# Patient Record
Sex: FEMALE | Race: OTHER | HISPANIC OR LATINO | Employment: FULL TIME | URBAN - METROPOLITAN AREA
[De-identification: names, ages, dates, MRNs, and addresses within clinical notes are randomized per-mention and may not be internally consistent; named-entity substitution may affect disease eponyms.]

---

## 2018-08-16 ENCOUNTER — HOSPITAL ENCOUNTER (EMERGENCY)
Facility: HOSPITAL | Age: 23
Discharge: HOME | End: 2018-08-16
Attending: EMERGENCY MEDICINE
Payer: COMMERCIAL

## 2018-08-16 VITALS
WEIGHT: 112 LBS | TEMPERATURE: 98.5 F | DIASTOLIC BLOOD PRESSURE: 76 MMHG | SYSTOLIC BLOOD PRESSURE: 115 MMHG | OXYGEN SATURATION: 98 % | HEART RATE: 101 BPM | RESPIRATION RATE: 16 BRPM

## 2018-08-16 DIAGNOSIS — R35.0 URINARY FREQUENCY: ICD-10-CM

## 2018-08-16 DIAGNOSIS — J03.90 TONSILLITIS: Primary | ICD-10-CM

## 2018-08-16 LAB
BACTERIA UR QL AUTO: ABNORMAL /HPF
BILIRUB UR QL STRIP: NEGATIVE
CLARITY UR: CLEAR
COLOR UR: YELLOW
EXT PREG TEST URINE: NEGATIVE
GLUCOSE UR STRIP-MCNC: NEGATIVE MG/DL
HGB UR QL STRIP.AUTO: ABNORMAL
KETONES UR STRIP-MCNC: NEGATIVE MG/DL
LEUKOCYTE ESTERASE UR QL STRIP: NEGATIVE
NITRITE UR QL STRIP: NEGATIVE
NON-SQ EPI CELLS URNS QL MICRO: ABNORMAL /HPF
OTHER STN SPEC: ABNORMAL
PH UR STRIP.AUTO: 7 [PH] (ref 4.5–8)
PROT UR STRIP-MCNC: ABNORMAL MG/DL
RBC #/AREA URNS AUTO: ABNORMAL /HPF
SP GR UR STRIP.AUTO: 1.01 (ref 1–1.03)
UROBILINOGEN UR QL STRIP.AUTO: 1 E.U./DL
WBC #/AREA URNS AUTO: ABNORMAL /HPF

## 2018-08-16 PROCEDURE — 81001 URINALYSIS AUTO W/SCOPE: CPT

## 2018-08-16 PROCEDURE — 81025 URINE PREGNANCY TEST: CPT | Performed by: PHYSICIAN ASSISTANT

## 2018-08-16 PROCEDURE — 99283 EMERGENCY DEPT VISIT LOW MDM: CPT

## 2018-08-16 RX ORDER — BUPRENORPHINE AND NALOXONE 8; 2 MG/1; MG/1
2 FILM, SOLUBLE BUCCAL; SUBLINGUAL DAILY
COMMUNITY
Start: 2018-07-17

## 2018-08-16 RX ORDER — NORGESTIMATE AND ETHINYL ESTRADIOL 0.25-0.035
1 KIT ORAL DAILY
COMMUNITY
Start: 2018-06-14

## 2018-08-16 RX ORDER — DEXAMETHASONE 2 MG/1
10 TABLET ORAL ONCE
Qty: 5 TABLET | Refills: 0 | Status: SHIPPED | OUTPATIENT
Start: 2018-08-17 | End: 2018-08-17

## 2018-08-16 RX ORDER — CLINDAMYCIN HYDROCHLORIDE 150 MG/1
300 CAPSULE ORAL EVERY 8 HOURS SCHEDULED
Qty: 42 CAPSULE | Refills: 0 | Status: SHIPPED | OUTPATIENT
Start: 2018-08-16 | End: 2018-08-23

## 2018-08-16 RX ORDER — IBUPROFEN 600 MG/1
600 TABLET ORAL EVERY 8 HOURS PRN
Qty: 15 TABLET | Refills: 0 | Status: SHIPPED | OUTPATIENT
Start: 2018-08-16 | End: 2019-01-27

## 2018-08-17 NOTE — DISCHARGE INSTRUCTIONS
Tonsillitis, Ambulatory Care   GENERAL INFORMATION:   Tonsillitis  is an inflammation of the tonsils  Tonsils are 2 large lumps of tissue in the back of your throat  They help fight infection  Tonsillitis may be caused by a bacterial or a viral infection  Common symptoms include the following:   · Severe sore throat    · Red, swollen tonsils    · Painful swallowing    · Fever and chills    · Bad breath    · White spots on the tonsils  Seek immediate care  if you have trouble breathing because your tonsils are swollen  Treatment for tonsillitis  may include medicine to decrease throat pain  Antibiotic medicine may be given if your tonsillitis was caused by bacteria  You may also need surgery to remove your tonsils for chronic or recurrent tonsillitis  Prevent the spread of germs  by washing your hands often  Do not share food or drinks with anyone  Ask when you can return to work  Manage your symptoms:   · Drink plenty of liquids  to help prevent dehydration  Ask your healthcare provider how much you should drink  · Gargle with warm salt water  to help decrease throat pain  Mix 1 teaspoon of salt in 1 cup of warm water  Ask how often you should do this  Follow up with your healthcare provider as directed:  Write down your questions so you remember to ask them during your visits  CARE AGREEMENT:   You have the right to help plan your care  Learn about your health condition and how it may be treated  Discuss treatment options with your caregivers to decide what care you want to receive  You always have the right to refuse treatment  The above information is an  only  It is not intended as medical advice for individual conditions or treatments  Talk to your doctor, nurse or pharmacist before following any medical regimen to see if it is safe and effective for you    © 2014 [address] is for End User's use only and may not be sold, redistributed or otherwise used for commercial purposes  All illustrations and images included in CareNotes® are the copyrighted property of [employer]  or Benoit Pearson  Dysuria   WHAT YOU NEED TO KNOW:   Dysuria is difficulty urinating, or pain, burning, or discomfort with urination  Dysuria is usually a symptom of another problem  DISCHARGE INSTRUCTIONS:   Return to the emergency department if:   · You have severe back, side, or abdominal pain  · You have fever and shaking chills  · You vomit several times in a row  Contact your healthcare provider if:   · Your symptoms do not go away, even after treatment  · You have questions or concerns about your condition or care  Medicines:   · Medicines  may be given to help treat a bacterial infection or help decrease bladder spasms  · Take your medicine as directed  Contact your healthcare provider if you think your medicine is not helping or if you have side effects  Tell him of her if you are allergic to any medicine  Keep a list of the medicines, vitamins, and herbs you take  Include the amounts, and when and why you take them  Bring the list or the pill bottles to follow-up visits  Carry your medicine list with you in case of an emergency  Follow up with your healthcare provider as directed: Your healthcare provider may also refer you to a urologist or nephrologist to have additional testing  Write down your questions so you remember to ask them during your visits  Manage your dysuria:   · Drink more liquids  Liquids help flush out bacteria that may be causing an infection  Ask your healthcare provider how much liquid to drink each day and which liquids are best for you  · Take sitz baths as directed  Fill a bathtub with 4 to 6 inches of warm water  You may also use a sitz bath pan that fits over a toilet  Sit in the sitz bath for 20 minutes  Do this 2 to 3 times a day, or as directed  The warm water can help decrease pain and swelling     © 2017 [facility] [address] is for End User's use only and may not be sold, redistributed or otherwise used for commercial purposes  All illustrations and images included in CareNotes® are the copyrighted property of [employer]  or Benoit Pearson  The above information is an  only  It is not intended as medical advice for individual conditions or treatments  Talk to your doctor, nurse or pharmacist before following any medical regimen to see if it is safe and effective for you

## 2018-08-17 NOTE — ED PROVIDER NOTES
History  Chief Complaint   Patient presents with    Possible UTI     patient reports burning with urination and incresed frequency   Sore Throat     sore throat reported  History provided by:  Patient  Sore Throat   Location:  Generalized  Quality:  Sharp and sore  Severity:  Moderate  Onset quality:  Gradual  Duration:  2 days  Timing:  Constant  Progression:  Unchanged  Chronicity:  New  Relieved by:  None tried  Worsened by:  Swallowing and eating  Ineffective treatments:  None tried  Associated symptoms: adenopathy    Associated symptoms: no abdominal pain, no chest pain, no chills, no cough, no drooling, no ear discharge, no ear pain, no epistaxis, no eye discharge, no fever, no headaches, no neck stiffness, no night sweats, no plugged ear sensation, no postnasal drip, no rash, no rhinorrhea, no shortness of breath, no sinus congestion, no stridor, no trouble swallowing and no voice change    Risk factors: no exposure to strep, no exposure to mono, no sick contacts and no recent ENT procedure    Urinary Frequency   Location:  Bladder  Duration:  1 week  Timing:  Intermittent  Progression:  Waxing and waning  Chronicity:  New  Context:  Frequency, dysuria at times  no hematuria no flank pain no odor  no vaginal discharge or lesions  Associated symptoms: sore throat    Associated symptoms: no abdominal pain, no chest pain, no congestion, no cough, no diarrhea, no ear pain, no fever, no headaches, no rash, no rhinorrhea, no shortness of breath and no vomiting        Prior to Admission Medications   Prescriptions Last Dose Informant Patient Reported? Taking? buprenorphine-naloxone (SUBOXONE) 8-2 mg   Yes Yes   Sig: Place 2 Film under the tongue   norgestimate-ethinyl estradiol (SPRINTEC 28) 0 25-35 MG-MCG per tablet   Yes Yes   Sig: Take 1 tablet by mouth      Facility-Administered Medications: None       History reviewed  No pertinent past medical history  History reviewed   No pertinent surgical history  History reviewed  No pertinent family history  I have reviewed and agree with the history as documented  Social History   Substance Use Topics    Smoking status: Current Every Day Smoker    Smokeless tobacco: Never Used    Alcohol use No        Review of Systems   Constitutional: Negative for activity change, appetite change, chills, fever and night sweats  HENT: Positive for sore throat  Negative for congestion, drooling, ear discharge, ear pain, nosebleeds, postnasal drip, rhinorrhea, trouble swallowing and voice change  Eyes: Negative for discharge  Respiratory: Negative for cough, shortness of breath and stridor  Cardiovascular: Negative for chest pain and leg swelling  Gastrointestinal: Negative for abdominal pain, blood in stool, diarrhea and vomiting  Genitourinary: Positive for dysuria and frequency  Negative for decreased urine volume, difficulty urinating, vaginal bleeding, vaginal discharge and vaginal pain  Musculoskeletal: Negative for back pain, gait problem and neck stiffness  Skin: Negative for rash and wound  Neurological: Negative for dizziness and headaches  Hematological: Positive for adenopathy  Physical Exam  Physical Exam   Constitutional: She appears well-developed and well-nourished  No distress  HENT:   Head: Normocephalic and atraumatic  Head is without right periorbital erythema and without left periorbital erythema  Right Ear: Tympanic membrane and external ear normal    Left Ear: Tympanic membrane and external ear normal    Nose: Nose normal  No mucosal edema or rhinorrhea  Mouth/Throat: Uvula is midline and mucous membranes are normal  No oral lesions  No trismus in the jaw  No uvula swelling  Posterior oropharyngeal erythema present  No posterior oropharyngeal edema or tonsillar abscesses  Tonsils are 2+ on the right  Tonsils are 2+ on the left  Tonsillar exudate     Eyes: Conjunctivae are normal  Pupils are equal, round, and reactive to light  Right eye exhibits no discharge  Left eye exhibits no discharge  Neck: Normal range of motion  Neck supple  Cardiovascular: Normal rate, regular rhythm, normal heart sounds and intact distal pulses  No murmur heard  Pulmonary/Chest: Effort normal and breath sounds normal  No respiratory distress  She exhibits no tenderness  Musculoskeletal: She exhibits no edema  Lymphadenopathy:     She has cervical adenopathy  Neurological: She is alert  Skin: Skin is warm and dry  Capillary refill takes less than 2 seconds  No rash noted  She is not diaphoretic  Psychiatric: She has a normal mood and affect  Nursing note and vitals reviewed        Vital Signs  ED Triage Vitals [08/16/18 2314]   Temperature Pulse Respirations Blood Pressure SpO2   98 5 °F (36 9 °C) 101 16 115/76 98 %      Temp src Heart Rate Source Patient Position - Orthostatic VS BP Location FiO2 (%)   -- -- Sitting Right arm --      Pain Score       --           Vitals:    08/16/18 2314   BP: 115/76   Pulse: 101   Patient Position - Orthostatic VS: Sitting       Visual Acuity      ED Medications  Medications - No data to display    Diagnostic Studies  Results Reviewed     Procedure Component Value Units Date/Time    Urine Microscopic [[id number]]  (Abnormal) Collected:  08/16/18 2348    Lab Status:  Final result Specimen:  Urine from Urine, Other Updated:  08/16/18 2358     RBC, UA 1-2 (A) /hpf      WBC, UA 1-2 (A) /hpf      Epithelial Cells Occasional /hpf      Bacteria, UA Occasional /hpf      OTHER OBSERVATIONS Sperm Present    POCT pregnancy, urine [[id number]]  (Normal) Resulted:  08/16/18 2340    Lab Status:  Final result Updated:  08/16/18 2345     EXT PREG TEST UR (Ref: Negative) NEGATIVE    POCT urinalysis dipstick [[id number]]  (Abnormal) Resulted:  08/16/18 2340    Lab Status:  Final result Updated:  08/16/18 2345    ED Urine Macroscopic [[id number]]  (Abnormal) Collected:  08/16/18 2348    Lab Status:  Final result Specimen:  Urine Updated:  08/16/18 2341     Color, UA Yellow     Clarity, UA Clear     pH, UA 7 0     Leukocytes, UA Negative     Nitrite, UA Negative     Protein, UA Trace (A) mg/dl      Glucose, UA Negative mg/dl      Ketones, UA Negative mg/dl      Urobilinogen, UA 1 0 E U /dl      Bilirubin, UA Negative     Blood, UA Trace (A)     Specific [place], UA 1 015    Narrative:       CLINITEK RESULT                 No orders to display              Procedures  Procedures       Phone Contacts  ED Phone Contact    ED Course  ED Course as of Aug 17 0042   Thu Aug 16, 2018   2343 Color, UA: Yellow   2343 Leukocytes, UA: Negative   2343 Nitrite, UA: Negative   2343 Protein, UA: (!) Trace   2343 Blood, UA: (!) Trace   2348 EXT PREG TEST UR (Ref: Negative): NEGATIVE                               MDM  Number of Diagnoses or Management Options  Tonsillitis: new and does not require workup  Urinary frequency: new and does not require workup     Amount and/or Complexity of Data Reviewed  Clinical lab tests: reviewed and ordered    Risk of Complications, Morbidity, and/or Mortality  Presenting problems: low  Diagnostic procedures: minimal  Management options: minimal    Patient Progress  Patient progress: stable    CritCare Time    Disposition  Final diagnoses: Tonsillitis   Urinary frequency     Time reflects when diagnosis was documented in both MDM as applicable and the Disposition within this note     Time User Action Codes Description Comment    8/16/2018 11:48 PM Chitra Durán [J03 [id number]] Tonsillitis     8/16/2018 11:49 PM Chitra Durán [R35 0] Urinary frequency       ED Disposition     ED Disposition Condition Comment    Discharge  Amanda Mata discharge to home/self care      Condition at discharge: Good        Follow-up Information     Follow up With Specialties Details Why [address] Family Medicine Schedule an appointment as soon as possible for a visit To establish PCP, ER followup [address] [facility]  [phone]  [facility] Schedule an appointment as soon as possible for a visit To establish OBGYN [phone]          Discharge Medication List as of 8/16/2018 11:51 PM      START taking these medications    Details   clindamycin (CLEOCIN) 150 mg capsule Take 2 capsules (300 mg total) by mouth every 8 (eight) hours for 7 days, Starting Thu 8/16/2018, Until Thu 8/23/2018, Print      dexamethasone (DECADRON) 2 mg tablet Take 5 tablets (10 mg total) by mouth once for 1 dose, Starting Fri 8/17/2018, Print      ibuprofen (MOTRIN) 600 mg tablet Take 1 tablet (600 mg total) by mouth every 8 (eight) hours as needed for mild pain or moderate pain for up to 5 days, Starting Thu 8/16/2018, Until Tue 8/21/2018, Print         CONTINUE these medications which have NOT CHANGED    Details   buprenorphine-naloxone (SUBOXONE) 8-2 mg Place 2 Film under the tongue, Starting Tue 7/17/2018, Historical Med      norgestimate-ethinyl estradiol ([address] [address]) 0 25-35 MG-MCG per tablet Take 1 tablet by mouth, Starting Thu 6/14/2018, Historical Med           No discharge procedures on file      ED Provider  Electronically Signed by           Maryse Schuler PA-C  08/17/18 6299

## 2019-01-27 ENCOUNTER — HOSPITAL ENCOUNTER (EMERGENCY)
Facility: HOSPITAL | Age: 24
Discharge: HOME | End: 2019-01-27
Attending: EMERGENCY MEDICINE | Admitting: EMERGENCY MEDICINE
Payer: COMMERCIAL

## 2019-01-27 VITALS
SYSTOLIC BLOOD PRESSURE: 118 MMHG | DIASTOLIC BLOOD PRESSURE: 68 MMHG | OXYGEN SATURATION: 98 % | TEMPERATURE: 98 F | HEART RATE: 88 BPM | RESPIRATION RATE: 16 BRPM

## 2019-01-27 DIAGNOSIS — J06.9 VIRAL URI WITH COUGH: Primary | ICD-10-CM

## 2019-01-27 LAB — EXT PREG TEST URINE: NEGATIVE

## 2019-01-27 PROCEDURE — 81025 URINE PREGNANCY TEST: CPT | Performed by: EMERGENCY MEDICINE

## 2019-01-27 PROCEDURE — 99283 EMERGENCY DEPT VISIT LOW MDM: CPT

## 2019-01-27 RX ORDER — CETIRIZINE HYDROCHLORIDE 10 MG/1
10 TABLET ORAL DAILY
Qty: 30 TABLET | Refills: 0 | Status: SHIPPED | OUTPATIENT
Start: 2019-01-27 | End: 2019-03-15

## 2019-01-27 RX ORDER — FLUTICASONE PROPIONATE 50 MCG
1 SPRAY, SUSPENSION (ML) NASAL DAILY
Qty: 16 G | Refills: 0 | Status: SHIPPED | OUTPATIENT
Start: 2019-01-27 | End: 2019-03-15

## 2019-01-28 NOTE — ED PROVIDER NOTES
History  Chief Complaint   Patient presents with    Cold Like Symptoms     5 days of generalized weakness, headaches, and nausea  took excedrin at 1600 which relieved symptoms  Patient currently taking suboxone  History provided by:  Patient  URI   Presenting symptoms: congestion, cough, fever (102 yesterday) and sore throat    Severity:  Moderate  Onset quality:  Gradual  Duration:  5 days  Timing:  Sporadic  Progression:  Waxing and waning  Chronicity:  New  Relieved by:  OTC medications (HA relieved with tylenol)  Worsened by:  Nothing  Associated symptoms: no headaches, no neck pain and no wheezing    Risk factors: sick contacts (boyfriend had URI)    Risk factors: no diabetes mellitus and no recent travel        Prior to Admission Medications   Prescriptions Last Dose Informant Patient Reported? Taking? buprenorphine-naloxone (SUBOXONE) 8-2 mg   Yes Yes   Sig: Place 2 Film under the tongue   ibuprofen (MOTRIN) 600 mg tablet   No No   Sig: Take 1 tablet (600 mg total) by mouth every 8 (eight) hours as needed for mild pain or moderate pain for up to 5 days   norgestimate-ethinyl estradiol (SPRINTEC 28) 0 25-35 MG-MCG per tablet   Yes No   Sig: Take 1 tablet by mouth      Facility-Administered Medications: None       History reviewed  No pertinent past medical history  History reviewed  No pertinent surgical history  History reviewed  No pertinent family history  I have reviewed and agree with the history as documented  Social History   Substance Use Topics    Smoking status: Current Every Day Smoker     Packs/day: 1 00    Smokeless tobacco: Never Used    Alcohol use No        Review of Systems   Constitutional: Positive for fever (102 yesterday)  Negative for appetite change and chills  HENT: Positive for congestion, postnasal drip and sore throat  Respiratory: Positive for cough  Negative for shortness of breath and wheezing      Cardiovascular: Negative for chest pain and palpitations  Gastrointestinal: Positive for nausea  Negative for abdominal pain, diarrhea and vomiting  Genitourinary: Negative for dysuria and hematuria  Musculoskeletal: Negative for neck pain  Skin: Negative for rash  Neurological: Negative for dizziness, weakness and headaches  Psychiatric/Behavioral: Negative for suicidal ideas  All other systems reviewed and are negative  Physical Exam  Physical Exam   Constitutional: She is oriented to person, place, and time  Vital signs are normal  She appears well-developed and well-nourished  Non-toxic appearance  HENT:   Head: Normocephalic and atraumatic  Right Ear: Tympanic membrane and external ear normal    Left Ear: Tympanic membrane and external ear normal    Nose: Mucosal edema (congested) present  Mouth/Throat: Posterior oropharyngeal erythema (post nasal drip) present  Eyes: Pupils are equal, round, and reactive to light  Conjunctivae and EOM are normal    Neck: Normal range of motion and full passive range of motion without pain  Neck supple  No Brudzinski's sign and no Kernig's sign noted  Cardiovascular: Normal rate, regular rhythm, normal heart sounds, intact distal pulses and normal pulses  No murmur heard  Pulmonary/Chest: Effort normal and breath sounds normal  No tachypnea  No respiratory distress  She has no wheezes  Abdominal: Soft  Bowel sounds are normal  She exhibits no distension  There is no tenderness  There is no rigidity, no rebound and no guarding  Musculoskeletal: Normal range of motion  Right lower leg: She exhibits no swelling  Left lower leg: She exhibits no swelling  Lymphadenopathy:     She has no cervical adenopathy  Neurological: She is alert and oriented to person, place, and time  She has normal strength and normal reflexes  No cranial nerve deficit or sensory deficit  Coordination and gait normal  GCS eye subscore is 4  GCS verbal subscore is 5  GCS motor subscore is 6  Skin: Skin is warm and dry  No rash noted  She is not diaphoretic  No pallor  Psychiatric: She has a normal mood and affect  Her speech is normal and behavior is normal  Judgment and thought content normal  Cognition and memory are normal    Nursing note and vitals reviewed  Vital Signs  ED Triage Vitals [01/27/19 2112]   Temperature Pulse Respirations Blood Pressure SpO2   98 °F (36 7 °C) 88 16 118/68 98 %      Temp Source Heart Rate Source Patient Position - Orthostatic VS BP Location FiO2 (%)   Oral Monitor Sitting Right arm --      Pain Score       3           Vitals:    01/27/19 2112   BP: 118/68   Pulse: 88   Patient Position - Orthostatic VS: Sitting       Visual Acuity      ED Medications  Medications - No data to display    Diagnostic Studies  Results Reviewed     Procedure Component Value Units Date/Time    POCT pregnancy, urine [[id number]]     Lab Status:  No result                  No orders to display              Procedures  Procedures       Phone Contacts  ED Phone Contact    ED Course                               MDM  CritCare Time    Disposition  Final diagnoses:   Viral URI with cough     Time reflects when diagnosis was documented in both MDM as applicable and the Disposition within this note     Time User Action Codes Description Comment    1/27/2019  9:40 PM Shaan Durán [J06 9,  B97 89] Viral URI with cough       ED Disposition     ED Disposition Condition Comment    Discharge  Rodolfo Melo discharge to home/self care      Condition at discharge: Good        Follow-up Information     Follow up With Specialties Details Why Contact Info    [facility]  Call For followup with local primary care [phone]            Patient's Medications   Discharge Prescriptions    CETIRIZINE (ZYRTEC) 10 MG TABLET    Take 1 tablet (10 mg total) by mouth daily       Start Date: 1/27/2019 End Date: 1/27/2020       Order Dose: 10 mg       Quantity: 30 tablet    Refills: 0 DEXTROMETHORPHAN-GUAIFENESIN (MUCINEX DM)  MG PER 12 HR TABLET    Take 1 tablet by mouth every 12 (twelve) hours as needed for cough       Start Date: 1/27/2019 End Date: --       Order Dose: 1 tablet       Quantity: 10 tablet    Refills: 0    FLUTICASONE (FLONASE) 50 MCG/ACT NASAL SPRAY    1 spray into each nostril daily       Start Date: 1/27/2019 End Date: 1/27/2020       Order Dose: 1 spray       Quantity: 16 g    Refills: 0     No discharge procedures on file      ED Provider  Electronically Signed by           Stephanie Davis MD  01/27/19 1686

## 2019-03-15 ENCOUNTER — HOSPITAL ENCOUNTER (EMERGENCY)
Facility: HOSPITAL | Age: 24
Discharge: HOME | End: 2019-03-15
Attending: EMERGENCY MEDICINE
Payer: COMMERCIAL

## 2019-03-15 VITALS
WEIGHT: 111.99 LBS | RESPIRATION RATE: 16 BRPM | TEMPERATURE: 98.5 F | SYSTOLIC BLOOD PRESSURE: 109 MMHG | DIASTOLIC BLOOD PRESSURE: 67 MMHG | HEART RATE: 92 BPM | OXYGEN SATURATION: 100 %

## 2019-03-15 DIAGNOSIS — J02.9 PHARYNGITIS: Primary | ICD-10-CM

## 2019-03-15 DIAGNOSIS — N39.0 UTI (URINARY TRACT INFECTION): ICD-10-CM

## 2019-03-15 LAB
BACTERIA UR QL AUTO: ABNORMAL /HPF
BILIRUB UR QL STRIP: NEGATIVE
CLARITY UR: CLEAR
COLOR UR: ABNORMAL
EXT PREG TEST URINE: NEGATIVE
GLUCOSE UR STRIP-MCNC: ABNORMAL MG/DL
HGB UR QL STRIP.AUTO: NEGATIVE
KETONES UR STRIP-MCNC: ABNORMAL MG/DL
LEUKOCYTE ESTERASE UR QL STRIP: ABNORMAL
NITRITE UR QL STRIP: POSITIVE
NON-SQ EPI CELLS URNS QL MICRO: ABNORMAL /HPF
PH UR STRIP.AUTO: 5.5 [PH]
PROT UR STRIP-MCNC: ABNORMAL MG/DL
RBC #/AREA URNS AUTO: ABNORMAL /HPF
S PYO AG THROAT QL: NEGATIVE
SP GR UR STRIP.AUTO: 1.02 (ref 1–1.03)
UROBILINOGEN UR QL STRIP.AUTO: 4 E.U./DL
WBC #/AREA URNS AUTO: ABNORMAL /HPF

## 2019-03-15 PROCEDURE — 99283 EMERGENCY DEPT VISIT LOW MDM: CPT

## 2019-03-15 PROCEDURE — 81001 URINALYSIS AUTO W/SCOPE: CPT

## 2019-03-15 PROCEDURE — 87430 STREP A AG IA: CPT | Performed by: PHYSICIAN ASSISTANT

## 2019-03-15 PROCEDURE — 81025 URINE PREGNANCY TEST: CPT

## 2019-03-15 RX ORDER — PHENAZOPYRIDINE HYDROCHLORIDE 200 MG/1
200 TABLET, FILM COATED ORAL 3 TIMES DAILY
Qty: 6 TABLET | Refills: 0 | Status: SHIPPED | OUTPATIENT
Start: 2019-03-15

## 2019-03-15 RX ORDER — NITROFURANTOIN 25; 75 MG/1; MG/1
100 CAPSULE ORAL 2 TIMES DAILY
Qty: 10 CAPSULE | Refills: 0 | Status: SHIPPED | OUTPATIENT
Start: 2019-03-15 | End: 2019-03-20

## 2019-03-15 NOTE — ED PROVIDER NOTES
History  Chief Complaint   Patient presents with    Possible UTI     Patient reports she started experiencing urethra pain today  States "it feels like I'm being cut with a razor blade down there " Patient reports dysuria, denies abdominal pain  patient has hx of UTI   Sore Throat     X1 day, also reports nasal congestion, generalized body aches       23y  o female with no significant PMH presents to the ER for urethral pain since 11:00 today  Patient took Azo and Motrin for symptoms  She describes her pain as razor blades  She denies radiation of pain  Symptoms are constant  She is sexually active with one partner without protection  She denies history of kidney stone  Patient also complains of sore throat and myalgias for 2 days  She has been taking motrin for symptoms  She denies radiation of sore throat pain  Symptoms are constant  She denies sick contacts or recent travel  She denies fever, chills, rhinorrhea/congestion, chest pain, dyspnea, N/V/D, abdominal pain, weakness or paresthesias  History provided by:  Patient   used: No        Prior to Admission Medications   Prescriptions Last Dose Informant Patient Reported? Taking? buprenorphine-naloxone (SUBOXONE) 8-2 mg   Yes Yes   Sig: Place 2 Film under the tongue daily    norgestimate-ethinyl estradiol (SPRINTEC 28) 0 25-35 MG-MCG per tablet   Yes No   Sig: Take 1 tablet by mouth daily       Facility-Administered Medications: None       History reviewed  No pertinent past medical history  History reviewed  No pertinent surgical history  History reviewed  No pertinent family history  I have reviewed and agree with the history as documented  Social History     Tobacco Use    Smoking status: Current Every Day Smoker     Packs/day: 1 00    Smokeless tobacco: Never Used   Substance Use Topics    Alcohol use: No    Drug use: No        Review of Systems   Constitutional: Negative for chills and fever     HENT: Positive for sore throat  Negative for congestion, drooling, ear discharge, ear pain, facial swelling and rhinorrhea  Eyes: Negative for redness  Respiratory: Negative for shortness of breath  Cardiovascular: Negative for chest pain  Gastrointestinal: Negative for abdominal pain, diarrhea, nausea and vomiting  Genitourinary: Positive for dysuria  Negative for frequency, hematuria, urgency, vaginal bleeding and vaginal discharge  Musculoskeletal: Positive for back pain (entire back) and myalgias  Negative for neck stiffness  Skin: Negative for rash  Allergic/Immunologic: Negative for food allergies  Neurological: Negative for weakness and numbness  Physical Exam  Physical Exam   Constitutional:  Non-toxic appearance  No distress  HENT:   Head: Normocephalic and atraumatic  Right Ear: Tympanic membrane, external ear and ear canal normal  No drainage, swelling or tenderness  No foreign bodies  Tympanic membrane is not erythematous  No hemotympanum  Left Ear: Tympanic membrane, external ear and ear canal normal  No drainage, swelling or tenderness  No foreign bodies  Tympanic membrane is not erythematous  No hemotympanum  Nose: Nose normal    Mouth/Throat: Uvula is midline and mucous membranes are normal  No uvula swelling  Posterior oropharyngeal edema and posterior oropharyngeal erythema present  No tonsillar abscesses  No tonsillar exudate  Neck: Normal range of motion and phonation normal  Neck supple  No tracheal deviation present  Cardiovascular: Normal rate, regular rhythm, S1 normal, S2 normal and normal heart sounds  Exam reveals no gallop and no friction rub  No murmur heard  Pulmonary/Chest: Effort normal and breath sounds normal  No respiratory distress  She has no decreased breath sounds  She has no wheezes  She has no rhonchi  She has no rales  She exhibits no tenderness  Abdominal: Soft  Bowel sounds are normal  She exhibits no distension  There is no tenderness   There is no rebound, no guarding and no CVA tenderness  Neurological: She is alert  GCS eye subscore is 4  GCS verbal subscore is 5  GCS motor subscore is 6  Skin: Skin is warm and dry  No rash noted  Psychiatric: She has a normal mood and affect  Nursing note and vitals reviewed        Vital Signs  ED Triage Vitals [03/15/19 1331]   Temperature Pulse Respirations Blood Pressure SpO2   98 5 °F (36 9 °C) 92 16 109/67 100 %      Temp Source Heart Rate Source Patient Position - Orthostatic VS BP Location FiO2 (%)   Oral Monitor Sitting Right arm --      Pain Score       7           Vitals:    03/15/19 1331   BP: 109/67   Pulse: 92   Patient Position - Orthostatic VS: Sitting       qSOFA     Row Name 03/15/19 1331                Altered mental status GCS < 15  --        Respiratory Rate > / =29  0        Systolic BP < / =510  0        Q Sofa Score  0              Visual Acuity      ED Medications  Medications - No data to display    Diagnostic Studies  Results Reviewed     Procedure Component Value Units Date/Time    Urine Microscopic [[id number]]  (Abnormal) Collected:  03/15/19 1347    Lab Status:  Final result Specimen:  Urine, Clean Catch Updated:  03/15/19 1439     RBC, UA None Seen /hpf      WBC, UA 2-4 /hpf      Epithelial Cells Occasional /hpf      Bacteria, UA None Seen /hpf     UA w Reflex to Microscopic w Reflex to Culture [[id number]]  (Abnormal) Collected:  03/15/19 1347    Lab Status:  Final result Specimen:  Urine, Clean Catch Updated:  03/15/19 1439     Color, UA Orange     Clarity, UA Clear     Specific Gravity, UA 1 025     pH, UA 5 5     Leukocytes, UA Trace     Nitrite, UA Positive     Protein,  (2+) mg/dl      Glucose,  (1/10%) mg/dl      Ketones, UA Trace mg/dl      Urobilinogen, UA 4 0 E U /dl      Bilirubin, UA Negative     Blood, UA Negative    Narrative:       Results may be compromised due to color of urine    Rapid Strep A Screen Only, Adults [[id number]]  (Normal) Collected:  03/15/19 1347 Lab Status:  Final result Specimen:  Throat Updated:  03/15/19 1414     Rapid Strep A Screen Negative    POCT pregnancy, urine [[id number]]  (Normal) Resulted:  03/15/19 1347    Lab Status:  Final result Updated:  03/15/19 1347     EXT PREG TEST UR (Ref: Negative) negative                 No orders to display              Procedures  Procedures       Phone Contacts  ED Phone Contact    ED Course                               MDM  Number of Diagnoses or Management Options  Pharyngitis: new and requires workup  UTI (urinary tract infection): new and requires workup  Diagnosis management comments: DDX consists of but not limited to: UTI, kidney stone, strep, abscess, mono, viral syndrome, flu    Will check urine, pregnancy and strep  At discharge, I instructed the patient to:  -follow up with pcp  -take Tylenol or Motrin for pain  -take Macrobid as prescribed for UTI  -take Pyridium as prescribed for urinary symptoms  -rest and drink plenty of fluids  -return to the ER if symptoms worsened or new symptoms arose  Patient agreed to this plan and was stable at time of discharge  Amount and/or Complexity of Data Reviewed  Clinical lab tests: ordered and reviewed    Patient Progress  Patient progress: stable      Disposition  Final diagnoses:   Pharyngitis   UTI (urinary tract infection)     Time reflects when diagnosis was documented in both MDM as applicable and the Disposition within this note     Time User Action Codes Description Comment    3/15/2019  2:43 PM James CEDILLO Add [J02 9] Pharyngitis     3/15/2019  2:43 PM James CEDILLO Add [N39 0] UTI (urinary tract infection)       ED Disposition     ED Disposition Condition Date/Time Comment    Discharge Stable Fri Mar 15, 2019  2:43 PM Jenny Olivia discharge to home/self care              Follow-up Information     Follow up With Specialties Details Why Contact Info Additional [address] [facility] Schedule an appointment as soon as possible for a visit  As needed [address] [facility] [facility] [address] [facility] [address]  [address] [place] [address],[address], [address]          Patient's Medications   Discharge Prescriptions    NITROFURANTOIN (MACROBID) 100 MG CAPSULE    Take 1 capsule (100 mg total) by mouth 2 (two) times a day for 5 days       Start Date: 3/15/2019 End Date: 3/20/2019       Order Dose: 100 mg       Quantity: 10 capsule    Refills: 0    PHENAZOPYRIDINE (PYRIDIUM) 200 MG TABLET    Take 1 tablet (200 mg total) by mouth 3 (three) times a day       Start Date: 3/15/2019 End Date: --       Order Dose: 200 mg       Quantity: 6 tablet    Refills: 0     No discharge procedures on file      ED Provider  Electronically Signed by           Magdy Campbell PA-C  03/15/19 5877

## 2022-08-18 ENCOUNTER — HOSPITAL ENCOUNTER (EMERGENCY)
Facility: HOSPITAL | Age: 27
Discharge: HOME | End: 2022-08-18
Attending: EMERGENCY MEDICINE
Payer: COMMERCIAL

## 2022-08-18 ENCOUNTER — APPOINTMENT (EMERGENCY)
Dept: CT IMAGING | Facility: HOSPITAL | Age: 27
End: 2022-08-18
Payer: COMMERCIAL

## 2022-08-18 ENCOUNTER — APPOINTMENT (OUTPATIENT)
Dept: RADIOLOGY | Facility: HOSPITAL | Age: 27
End: 2022-08-18
Payer: COMMERCIAL

## 2022-08-18 VITALS
RESPIRATION RATE: 16 BRPM | TEMPERATURE: 97.7 F | HEART RATE: 102 BPM | SYSTOLIC BLOOD PRESSURE: 111 MMHG | OXYGEN SATURATION: 98 % | WEIGHT: 127.65 LBS | DIASTOLIC BLOOD PRESSURE: 67 MMHG

## 2022-08-18 DIAGNOSIS — E83.39 HYPOPHOSPHATEMIA: ICD-10-CM

## 2022-08-18 DIAGNOSIS — E87.6 HYPOKALEMIA: ICD-10-CM

## 2022-08-18 DIAGNOSIS — R07.9 CHEST PAIN, UNSPECIFIED TYPE: Primary | ICD-10-CM

## 2022-08-18 DIAGNOSIS — R00.2 PALPITATIONS: ICD-10-CM

## 2022-08-18 DIAGNOSIS — R00.0 TACHYCARDIA: ICD-10-CM

## 2022-08-18 DIAGNOSIS — N39.0 UTI (URINARY TRACT INFECTION): ICD-10-CM

## 2022-08-18 LAB
2HR DELTA HS TROPONIN: 2 NG/L
ALBUMIN SERPL BCP-MCNC: 4.2 G/DL (ref 3.5–5)
ALP SERPL-CCNC: 46 U/L (ref 46–116)
ALT SERPL W P-5'-P-CCNC: 21 U/L (ref 12–78)
AMPHETAMINES SERPL QL SCN: NEGATIVE
ANION GAP SERPL CALCULATED.3IONS-SCNC: 16 MMOL/L (ref 4–13)
AST SERPL W P-5'-P-CCNC: 37 U/L (ref 5–45)
ATRIAL RATE: 102 BPM
BACTERIA UR QL AUTO: ABNORMAL /HPF
BARBITURATES UR QL: NEGATIVE
BASE EX.OXY STD BLDV CALC-SCNC: 84.5 % (ref 60–80)
BASE EXCESS BLDV CALC-SCNC: -5.3 MMOL/L
BASOPHILS # BLD MANUAL: 0 THOUSAND/UL (ref 0–0.1)
BASOPHILS NFR MAR MANUAL: 0 % (ref 0–1)
BENZODIAZ UR QL: POSITIVE
BILIRUB SERPL-MCNC: 0.61 MG/DL (ref 0.2–1)
BILIRUB UR QL STRIP: NEGATIVE
BUN SERPL-MCNC: 6 MG/DL (ref 5–25)
CALCIUM SERPL-MCNC: 8.6 MG/DL (ref 8.3–10.1)
CARDIAC TROPONIN I PNL SERPL HS: 6 NG/L
CARDIAC TROPONIN I PNL SERPL HS: 8 NG/L
CHLORIDE SERPL-SCNC: 105 MMOL/L (ref 96–108)
CK MB SERPL-MCNC: 4.5 NG/ML (ref 0–5)
CK MB SERPL-MCNC: <1 % (ref 0–2.5)
CK SERPL-CCNC: 686 U/L (ref 26–192)
CLARITY UR: CLEAR
CO2 SERPL-SCNC: 20 MMOL/L (ref 21–32)
COCAINE UR QL: NEGATIVE
COLOR UR: YELLOW
CREAT SERPL-MCNC: 0.67 MG/DL (ref 0.6–1.3)
CRP SERPL QL: 3.2 MG/L
D DIMER PPP FEU-MCNC: 0.68 UG/ML FEU
EOSINOPHIL # BLD MANUAL: 0.08 THOUSAND/UL (ref 0–0.4)
EOSINOPHIL NFR BLD MANUAL: 1 % (ref 0–6)
ERYTHROCYTE [DISTWIDTH] IN BLOOD BY AUTOMATED COUNT: 12.2 % (ref 11.6–15.1)
ETHANOL SERPL-MCNC: 36 MG/DL (ref 0–3)
FLUAV RNA RESP QL NAA+PROBE: NEGATIVE
FLUBV RNA RESP QL NAA+PROBE: NEGATIVE
GFR SERPL CREATININE-BSD FRML MDRD: 122 ML/MIN/1.73SQ M
GLUCOSE SERPL-MCNC: 86 MG/DL (ref 65–140)
GLUCOSE SERPL-MCNC: 92 MG/DL (ref 65–140)
GLUCOSE UR STRIP-MCNC: NEGATIVE MG/DL
HCO3 BLDV-SCNC: 19 MMOL/L (ref 24–30)
HCT VFR BLD AUTO: 39.4 % (ref 34.8–46.1)
HGB BLD-MCNC: 13.2 G/DL (ref 11.5–15.4)
HGB UR QL STRIP.AUTO: NEGATIVE
KETONES UR STRIP-MCNC: ABNORMAL MG/DL
LACTATE SERPL-SCNC: 1.9 MMOL/L (ref 0.5–2)
LACTATE SERPL-SCNC: 2.9 MMOL/L (ref 0.5–2)
LEUKOCYTE ESTERASE UR QL STRIP: ABNORMAL
LIPASE SERPL-CCNC: 175 U/L (ref 73–393)
LYMPHOCYTES # BLD AUTO: 1.02 THOUSAND/UL (ref 0.6–4.47)
LYMPHOCYTES # BLD AUTO: 13 % (ref 14–44)
MAGNESIUM SERPL-MCNC: 1.8 MG/DL (ref 1.6–2.6)
MCH RBC QN AUTO: 29.8 PG (ref 26.8–34.3)
MCHC RBC AUTO-ENTMCNC: 33.5 G/DL (ref 31.4–37.4)
MCV RBC AUTO: 89 FL (ref 82–98)
METHADONE UR QL: NEGATIVE
METHGB MFR BLDCO: 0.2 % (ref 0.1–1.5)
MONOCYTES # BLD AUTO: 0.47 THOUSAND/UL (ref 0–1.22)
MONOCYTES NFR BLD: 6 % (ref 4–12)
NEUTROPHILS # BLD MANUAL: 5.48 THOUSAND/UL (ref 1.85–7.62)
NEUTS SEG NFR BLD AUTO: 70 % (ref 43–75)
NITRITE UR QL STRIP: NEGATIVE
NON-SQ EPI CELLS URNS QL MICRO: ABNORMAL /HPF
O2 CT BLDV-SCNC: 16.8 ML/DL
OPIATES UR QL SCN: NEGATIVE
OXYCODONE+OXYMORPHONE UR QL SCN: NEGATIVE
P AXIS: 64 DEGREES
PCO2 BLDV: 33.7 MM HG (ref 42–50)
PCP UR QL: NEGATIVE
PH BLDV: 7.37 [PH] (ref 7.3–7.4)
PH UR STRIP.AUTO: 6 [PH]
PHOSPHATE SERPL-MCNC: 1.9 MG/DL (ref 2.7–4.5)
PLATELET # BLD AUTO: 196 THOUSANDS/UL (ref 149–390)
PLATELET BLD QL SMEAR: ADEQUATE
PMV BLD AUTO: 10.9 FL (ref 8.9–12.7)
PO2 BLDV: 52.1 MM HG (ref 35–45)
POTASSIUM SERPL-SCNC: 3.3 MMOL/L (ref 3.5–5.3)
PR INTERVAL: 164 MS
PROT SERPL-MCNC: 7.5 G/DL (ref 6.4–8.4)
PROT UR STRIP-MCNC: NEGATIVE MG/DL
QRS AXIS: 97 DEGREES
QRSD INTERVAL: 86 MS
QT INTERVAL: 356 MS
QTC INTERVAL: 463 MS
RBC # BLD AUTO: 4.43 MILLION/UL (ref 3.81–5.12)
RBC #/AREA URNS AUTO: ABNORMAL /HPF
RBC MORPH BLD: NORMAL
RSV RNA RESP QL NAA+PROBE: NEGATIVE
SARS-COV-2 RNA RESP QL NAA+PROBE: NEGATIVE
SODIUM SERPL-SCNC: 141 MMOL/L (ref 135–147)
SP GR UR STRIP.AUTO: 1.01 (ref 1–1.03)
T WAVE AXIS: 58 DEGREES
THC UR QL: POSITIVE
TSH SERPL DL<=0.05 MIU/L-ACNC: 0.73 UIU/ML (ref 0.45–4.5)
UROBILINOGEN UR QL STRIP.AUTO: 0.2 E.U./DL
VARIANT LYMPHS # BLD AUTO: 10 %
VENTRICULAR RATE: 102 BPM
WBC # BLD AUTO: 7.83 THOUSAND/UL (ref 4.31–10.16)
WBC #/AREA URNS AUTO: ABNORMAL /HPF

## 2022-08-18 PROCEDURE — 99285 EMERGENCY DEPT VISIT HI MDM: CPT

## 2022-08-18 PROCEDURE — 85025 COMPLETE CBC W/AUTO DIFF WBC: CPT | Performed by: EMERGENCY MEDICINE

## 2022-08-18 PROCEDURE — 82077 ASSAY SPEC XCP UR&BREATH IA: CPT | Performed by: EMERGENCY MEDICINE

## 2022-08-18 PROCEDURE — 84443 ASSAY THYROID STIM HORMONE: CPT | Performed by: EMERGENCY MEDICINE

## 2022-08-18 PROCEDURE — 82553 CREATINE MB FRACTION: CPT | Performed by: EMERGENCY MEDICINE

## 2022-08-18 PROCEDURE — 83605 ASSAY OF LACTIC ACID: CPT | Performed by: EMERGENCY MEDICINE

## 2022-08-18 PROCEDURE — 83050 HGB METHEMOGLOBIN QUAN: CPT | Performed by: EMERGENCY MEDICINE

## 2022-08-18 PROCEDURE — 84100 ASSAY OF PHOSPHORUS: CPT | Performed by: EMERGENCY MEDICINE

## 2022-08-18 PROCEDURE — 96360 HYDRATION IV INFUSION INIT: CPT

## 2022-08-18 PROCEDURE — 80053 COMPREHEN METABOLIC PANEL: CPT | Performed by: EMERGENCY MEDICINE

## 2022-08-18 PROCEDURE — 36415 COLL VENOUS BLD VENIPUNCTURE: CPT | Performed by: EMERGENCY MEDICINE

## 2022-08-18 PROCEDURE — G1004 CDSM NDSC: HCPCS

## 2022-08-18 PROCEDURE — 99285 EMERGENCY DEPT VISIT HI MDM: CPT | Performed by: EMERGENCY MEDICINE

## 2022-08-18 PROCEDURE — 82805 BLOOD GASES W/O2 SATURATION: CPT | Performed by: EMERGENCY MEDICINE

## 2022-08-18 PROCEDURE — 84484 ASSAY OF TROPONIN QUANT: CPT | Performed by: EMERGENCY MEDICINE

## 2022-08-18 PROCEDURE — 82550 ASSAY OF CK (CPK): CPT | Performed by: EMERGENCY MEDICINE

## 2022-08-18 PROCEDURE — 82948 REAGENT STRIP/BLOOD GLUCOSE: CPT

## 2022-08-18 PROCEDURE — 85007 BL SMEAR W/DIFF WBC COUNT: CPT | Performed by: EMERGENCY MEDICINE

## 2022-08-18 PROCEDURE — 86140 C-REACTIVE PROTEIN: CPT | Performed by: EMERGENCY MEDICINE

## 2022-08-18 PROCEDURE — 71045 X-RAY EXAM CHEST 1 VIEW: CPT

## 2022-08-18 PROCEDURE — 81001 URINALYSIS AUTO W/SCOPE: CPT | Performed by: EMERGENCY MEDICINE

## 2022-08-18 PROCEDURE — 93010 ELECTROCARDIOGRAM REPORT: CPT | Performed by: INTERNAL MEDICINE

## 2022-08-18 PROCEDURE — 96361 HYDRATE IV INFUSION ADD-ON: CPT

## 2022-08-18 PROCEDURE — 85379 FIBRIN DEGRADATION QUANT: CPT | Performed by: EMERGENCY MEDICINE

## 2022-08-18 PROCEDURE — 0241U HB NFCT DS VIR RESP RNA 4 TRGT: CPT | Performed by: EMERGENCY MEDICINE

## 2022-08-18 PROCEDURE — 83690 ASSAY OF LIPASE: CPT | Performed by: EMERGENCY MEDICINE

## 2022-08-18 PROCEDURE — 80307 DRUG TEST PRSMV CHEM ANLYZR: CPT | Performed by: EMERGENCY MEDICINE

## 2022-08-18 PROCEDURE — 93005 ELECTROCARDIOGRAM TRACING: CPT

## 2022-08-18 PROCEDURE — 87086 URINE CULTURE/COLONY COUNT: CPT | Performed by: EMERGENCY MEDICINE

## 2022-08-18 PROCEDURE — 71275 CT ANGIOGRAPHY CHEST: CPT

## 2022-08-18 PROCEDURE — 83735 ASSAY OF MAGNESIUM: CPT | Performed by: EMERGENCY MEDICINE

## 2022-08-18 PROCEDURE — 85027 COMPLETE CBC AUTOMATED: CPT | Performed by: EMERGENCY MEDICINE

## 2022-08-18 RX ORDER — CIPROFLOXACIN 500 MG/1
500 TABLET, FILM COATED ORAL 2 TIMES DAILY
Qty: 14 TABLET | Refills: 0 | Status: SHIPPED | OUTPATIENT
Start: 2022-08-18 | End: 2022-08-23

## 2022-08-18 RX ORDER — LIDOCAINE HYDROCHLORIDE 20 MG/ML
15 SOLUTION OROPHARYNGEAL
COMMUNITY
Start: 2022-04-04

## 2022-08-18 RX ORDER — CIPROFLOXACIN 500 MG/1
500 TABLET, FILM COATED ORAL ONCE
Status: COMPLETED | OUTPATIENT
Start: 2022-08-18 | End: 2022-08-18

## 2022-08-18 RX ADMIN — SODIUM CHLORIDE 1000 ML: 0.9 INJECTION, SOLUTION INTRAVENOUS at 07:21

## 2022-08-18 RX ADMIN — SODIUM CHLORIDE 1000 ML: 0.9 INJECTION, SOLUTION INTRAVENOUS at 06:25

## 2022-08-18 RX ADMIN — POTASSIUM & SODIUM PHOSPHATES POWDER PACK 280-160-250 MG 2 PACKET: 280-160-250 PACK at 08:25

## 2022-08-18 RX ADMIN — IOHEXOL 65 ML: 350 INJECTION, SOLUTION INTRAVENOUS at 07:14

## 2022-08-18 RX ADMIN — CIPROFLOXACIN HYDROCHLORIDE 500 MG: 500 TABLET, FILM COATED ORAL at 09:51

## 2022-08-18 NOTE — ED PROVIDER NOTES
History  Chief Complaint   Patient presents with    Rapid Heart Rate     Patient complaining of fast heart rate, feels like her arms are on fire and feels restless  Patient was seen at [facility] yesterday  Patient recently had tooth extracted  23-year-old female with past medical history pertinent for Suboxone use, current Bactrim use for UTI, previous reported "blood clot in the chest" (though denies "PE") after she had her baby who presents to the emergency department with  for evaluation of palpitations, continued chest pain, arm pain and lower leg pain along with restlessness and feeling like her skin is on fire after being seen at the hospital yesterday after having similar symptoms after patient had her tooth extracted from a dentist today before  Per chart review, patient had a full workup including troponins which were noted to trend upward initially and then back down  Patient had troponin of 17, 43 and then back to 16  Patient was given benzodiazepines yesterday and reports that she went home after she was discharged and slept for almost 8 hours  States that she began having symptoms of shin she woke up  States that she has been using Suboxone for the last 5 years states that she used to take 2 8 mg film daily up until about 6 months ago when she switched to 1 film daily  States that she switch from film to tablets about 5 days ago  States the mg still the same at 8 mg daily  States she normally takes her Suboxone at 8:00 a m  daily but did not take it yesterday until around noon because that was when she was discharged  States that she feels like she is in withdrawal, sweating and diaphoretic with chest pain and symptoms of palpitations  Patient reports that she also is being treated for possible UTI with Bactrim which she has been taking for the last 4 days  Patient reports still having some burning on urination     here was concerned that she may have a reaction to the dental numbing medication that was used to extract the tooth as she and he reports that they had use 4 shots to numb her tooth  States they were worried that patient may have methemoglobinemia  Patient reports that she was short of breath last night  Also reports still having chest pain  No other concerns at this time  Prior to Admission Medications   Prescriptions Last Dose Informant Patient Reported? Taking? Lidocaine Viscous HCl (XYLOCAINE) 2 % mucosal solution Unknown at Unknown time  Yes No   Sig: 15 mL by Transmucosal route   buprenorphine-naloxone (Suboxone) 8-2 mg Unknown at Unknown time  Yes No   Sig: Place 2 Film under the tongue daily    norgestimate-ethinyl estradiol (ORTHO-CYCLEN) 0 25-35 MG-MCG per tablet Not Taking at Unknown time  Yes No   Sig: Take 1 tablet by mouth daily    Patient not taking: Reported on 8/18/2022   phenazopyridine (PYRIDIUM) 200 mg tablet Not Taking at Unknown time  No No   Sig: Take 1 tablet (200 mg total) by mouth 3 (three) times a day   Patient not taking: Reported on 8/18/2022      Facility-Administered Medications: None       History reviewed  No pertinent past medical history  History reviewed  No pertinent surgical history  History reviewed  No pertinent family history  I have reviewed and agree with the history as documented  E-Cigarette/Vaping    E-Cigarette Use Never User      E-Cigarette/Vaping Substances     Social History     Tobacco Use    Smoking status: Current Every Day Smoker     Packs/day: 1 00    Smokeless tobacco: Never Used   Vaping Use    Vaping Use: Never used   Substance Use Topics    Alcohol use: No    Drug use: No       Review of Systems   Constitutional: Positive for diaphoresis  Negative for activity change, appetite change, chills and fever  HENT: Negative for congestion, rhinorrhea and sore throat  Eyes: Negative for visual disturbance  Respiratory: Positive for shortness of breath   Negative for chest tightness and wheezing  Cardiovascular: Positive for chest pain and palpitations  Negative for leg swelling  Gastrointestinal: Negative for abdominal pain, constipation, diarrhea, nausea and vomiting  Genitourinary: Positive for dysuria  Negative for difficulty urinating, flank pain, hematuria and pelvic pain  Musculoskeletal: Positive for myalgias  Negative for back pain and neck pain  Skin: Negative for rash  Neurological: Negative for weakness, numbness and headaches  Psychiatric/Behavioral: Negative for behavioral problems  Physical Exam  Physical Exam  Vitals and nursing note reviewed  Constitutional:       General: She is not in acute distress  Appearance: She is well-developed  She is diaphoretic  HENT:      Head: Normocephalic and atraumatic  Right Ear: External ear normal       Left Ear: External ear normal       Nose: Nose normal       Mouth/Throat:      Mouth: Mucous membranes are moist    Eyes:      Pupils: Pupils are equal, round, and reactive to light  Cardiovascular:      Rate and Rhythm: Regular rhythm  Tachycardia present  Pulses: Normal pulses  Heart sounds: Normal heart sounds  Pulmonary:      Effort: Pulmonary effort is normal  No respiratory distress  Breath sounds: Normal breath sounds  No wheezing or rales  Abdominal:      General: Bowel sounds are normal       Palpations: Abdomen is soft  Tenderness: There is no abdominal tenderness  Musculoskeletal:         General: No tenderness or deformity  Normal range of motion  Cervical back: Normal range of motion and neck supple  Skin:     General: Skin is warm  Capillary Refill: Capillary refill takes less than 2 seconds  Neurological:      General: No focal deficit present  Mental Status: She is alert and oriented to person, place, and time  Motor: No abnormal muscle tone  Comments: Patient is awake and alert and oriented x 3     No apparent deficits of memory or concentration  Speech is fluent  Fund of knowledge is appropriate  CN II - No field cuts by confrontation  CN III, IV, VI - pupils are 3 mm and briskly reactive  EOMs are full with no nystagmus  CN V - facial sensation is intact  CN VII - no facial asymmetry  CN VIII - auditory acuity is grossly intact to finger rub bilaterally  CN IX - palate rises symmetrically  CN XI - SCM and trapezius muscles are intact  CN XII - tongue protrudes midline  Sensory exam in intact to light touch, pinprick in all dermatomes tested  Coordination is grossly intact for finger-to-nose  5/5 strength in all extremities  Vital Signs  ED Triage Vitals [08/18/22 0525]   Temperature Pulse Respirations Blood Pressure SpO2   97 7 °F (36 5 °C) (!) 116 18 123/81 98 %      Temp Source Heart Rate Source Patient Position - Orthostatic VS BP Location FiO2 (%)   Temporal Monitor Lying Right arm --      Pain Score       --           Vitals:    08/18/22 0525 08/18/22 0530   BP: 123/81 123/81   Pulse: (!) 116 93   Patient Position - Orthostatic VS: Lying          Visual Acuity      ED Medications  Medications   sodium chloride 0 9 % bolus 1,000 mL (1,000 mL Intravenous New Bag 8/18/22 0625)   potassium-sodium phosphates (PHOS-NAK) packet 2 packet (has no administration in time range)   sodium chloride 0 9 % bolus 1,000 mL (has no administration in time range)       Diagnostic Studies  Results Reviewed     Procedure Component Value Units Date/Time    Urine Microscopic [[id number]]  (Abnormal) Collected: 08/18/22 0625    Lab Status: Final result Specimen: Urine, Clean Catch Updated: 08/18/22 0655     RBC, UA 2-4 /hpf      WBC, UA 10-20 /hpf      Epithelial Cells Moderate /hpf      Bacteria, UA Moderate /hpf     Urine culture [[id number]] Collected: 08/18/22 0625    Lab Status:  In process Specimen: Urine, Clean Catch Updated: 08/18/22 0655    Comprehensive metabolic panel [[id number]]  (Abnormal) Collected: 08/18/22 0618    Lab Status: Final result Specimen: Blood from Arm, Left Updated: 08/18/22 6146     Sodium 141 mmol/L      Potassium 3 3 mmol/L      Chloride 105 mmol/L      CO2 20 mmol/L      ANION GAP 16 mmol/L      BUN 6 mg/dL      Creatinine 0 67 mg/dL      Glucose 86 mg/dL      Calcium 8 6 mg/dL      AST 37 U/L      ALT 21 U/L      Alkaline Phosphatase 46 U/L      Total Protein 7 5 g/dL      Albumin 4 2 g/dL      Total Bilirubin 0 61 mg/dL      eGFR 122 ml/min/1 73sq m     Narrative:      [place] guidelines for Chronic Kidney Disease (CKD):     Stage 1 with normal or high GFR (GFR > 90 mL/min/1 73 square meters)    Stage 2 Mild CKD (GFR = 60-89 mL/min/1 73 square meters)    Stage 3A Moderate CKD (GFR = 45-59 mL/min/1 73 square meters)    Stage 3B Moderate CKD (GFR = 30-44 mL/min/1 73 square meters)    Stage 4 Severe CKD (GFR = 15-29 mL/min/1 73 square meters)    Stage 5 End Stage CKD (GFR <15 mL/min/1 73 square meters)  Note: GFR calculation is accurate only with a steady state creatinine    Magnesium [[id number]]  (Normal) Collected: 08/18/22 0618    Lab Status: Final result Specimen: Blood from Arm, Left Updated: 08/18/22 0652     Magnesium 1 8 mg/dL     Phosphorus [[id number]]  (Abnormal) Collected: 08/18/22 0618    Lab Status: Final result Specimen: Blood from Arm, Left Updated: 08/18/22 0652     Phosphorus 1 9 mg/dL     Lipase [[id number]]  (Normal) Collected: 08/18/22 0618    Lab Status: Final result Specimen: Blood from Arm, Left Updated: 08/18/22 0652     Lipase 175 u/L     C-reactive protein [[id number]]  (Abnormal) Collected: 08/18/22 0618    Lab Status: Final result Specimen: Blood from Arm, Left Updated: 08/18/22 0652     CRP 3 2 mg/L     D-dimer, quantitative [[id number]]  (Abnormal) Collected: 08/18/22 0618    Lab Status: Final result Specimen: Blood from Arm, Left Updated: 08/18/22 0650     D-Dimer, Quant 0 68 ug/ml FEU     CK (with reflex to MB) [[id number]]  (Abnormal) Collected: 08/18/22 1242 Lab Status: Final result Specimen: Blood from Arm, Left Updated: 08/18/22 0649     Total  U/L     CKMB [[id number]] Collected: 08/18/22 0618    Lab Status: In process Specimen: Blood from Arm, Left Updated: 08/18/22 0649    CBC and differential [[id number]]  (Normal) Collected: 08/18/22 0618    Lab Status: Final result Specimen: Blood from Arm, Left Updated: 08/18/22 0645     WBC 7 83 Thousand/uL      RBC 4 43 Million/uL      Hemoglobin 13 2 g/dL      Hematocrit 39 4 %      MCV 89 fL      MCH 29 8 pg      MCHC 33 5 g/dL      RDW 12 2 %      MPV 10 9 fL      Platelets 460 Thousands/uL     Narrative: This is an appended report  These results have been appended to a previously verified report  Manual Differential(PHLEBS Do Not Order) [[id number]] Collected: 08/18/22 0618    Lab Status: In process Specimen: Blood from Arm, Left Updated: 08/18/22 0645    UA w Reflex to Microscopic w Reflex to Culture [[id number]]  (Abnormal) Collected: 08/18/22 0625    Lab Status: Final result Specimen: Urine, Clean Catch Updated: 08/18/22 0635     Color, UA Yellow     Clarity, UA Clear     Specific Gravity, UA 1 010     pH, UA 6 0     Leukocytes, UA Small     Nitrite, UA Negative     Protein, UA Negative mg/dl      Glucose, UA Negative mg/dl      Ketones, UA 15 (1+) mg/dl      Urobilinogen, UA 0 2 E U /dl      Bilirubin, UA Negative     Occult Blood, UA Negative    TSH [[id number]] Collected: 08/18/22 0618    Lab Status: In process Specimen: Blood from Arm, Left Updated: 08/18/22 0634    Methemoglobin [[id number]]  (Normal) Collected: 08/18/22 0618    Lab Status: Final result Specimen: Blood from Arm, Left Updated: 08/18/22 0634     Methemoglobin 0 2 %     Narrative:       Therapeutic levels (1 mg/mL and 2 mg/mL) of hydroxocobalamin may interfere with the fCOHb and fMetHb where it may cause lower than expected values    Blood gas, venous [[id number]]  (Abnormal) Collected: 08/18/22 0618    Lab Status: Final result Specimen: Blood from Arm, Left Updated: 08/18/22 0633     pH, Cipriano 7 369     pCO2, Cipriano 33 7 mm Hg      pO2, Cipriano 52 1 mm Hg      HCO3, Cipriano 19 0 mmol/L      Base Excess, Cipriano -5 3 mmol/L      O2 Content, Cipriano 16 8 ml/dL      O2 HGB, VENOUS 84 5 %     HS Troponin 0hr (reflex protocol) [[id number]] Collected: 08/18/22 0618    Lab Status: In process Specimen: Blood from Arm, Left Updated: 08/18/22 0626    Ethanol [[id number]] Collected: 08/18/22 0618    Lab Status: In process Specimen: Blood from Arm, Left Updated: 08/18/22 0626    Rapid drug screen, urine [[id number]] Collected: 08/18/22 0618    Lab Status: In process Specimen: Urine, Clean Catch Updated: 08/18/22 0626    Lactic acid, plasma [[id number]] Collected: 08/18/22 0618    Lab Status: In process Specimen: Blood from Arm, Left Updated: 08/18/22 0626    FLU/RSV/COVID - if FLU/RSV clinically relevant [[id number]] Collected: 08/18/22 0624    Lab Status:  In process Specimen: Nares from Nose Updated: 08/18/22 0625    Fingerstick Glucose (POCT) [[id number]]  (Normal) Collected: 08/18/22 0526    Lab Status: Final result Updated: 08/18/22 0534     POC Glucose 92 mg/dl                  XR chest 1 view portable    (Results Pending)   CTA ED chest PE Study    (Results Pending)              Procedures  ECG 12 Lead Documentation Only    Date/Time: 8/18/2022 5:33 AM  Performed by: Angel Johnson MD  Authorized by: Angel Johnson MD     ECG reviewed by me, the ED Provider: yes    Patient location:  ED  Previous ECG:     Previous ECG:  Unavailable  Interpretation:     Interpretation: abnormal    Rate:     ECG rate:  102    ECG rate assessment: tachycardic    Rhythm:     Rhythm: sinus tachycardia    Ectopy:     Ectopy: none    QRS:     QRS axis:  Right  Conduction:     Conduction: normal    ST segments:     ST segments:  Normal  T waves:     T waves: normal               ED Course          RESULTS:  Results Reviewed     Procedure Component Value Units Date/Time    Urine Microscopic [[id number]] (Abnormal) Collected: 08/18/22 0625    Lab Status: Final result Specimen: Urine, Clean Catch Updated: 08/18/22 0655     RBC, UA 2-4 /hpf      WBC, UA 10-20 /hpf      Epithelial Cells Moderate /hpf      Bacteria, UA Moderate /hpf     Urine culture [[id number]] Collected: 08/18/22 8175    Lab Status:  In process Specimen: Urine, Clean Catch Updated: 08/18/22 0655    Comprehensive metabolic panel [[id number]]  (Abnormal) Collected: 08/18/22 0618    Lab Status: Final result Specimen: Blood from Arm, Left Updated: 08/18/22 6427     Sodium 141 mmol/L      Potassium 3 3 mmol/L      Chloride 105 mmol/L      CO2 20 mmol/L      ANION GAP 16 mmol/L      BUN 6 mg/dL      Creatinine 0 67 mg/dL      Glucose 86 mg/dL      Calcium 8 6 mg/dL      AST 37 U/L      ALT 21 U/L      Alkaline Phosphatase 46 U/L      Total Protein 7 5 g/dL      Albumin 4 2 g/dL      Total Bilirubin 0 61 mg/dL      eGFR 122 ml/min/1 73sq m     Narrative:      [place] guidelines for Chronic Kidney Disease (CKD):     Stage 1 with normal or high GFR (GFR > 90 mL/min/1 73 square meters)    Stage 2 Mild CKD (GFR = 60-89 mL/min/1 73 square meters)    Stage 3A Moderate CKD (GFR = 45-59 mL/min/1 73 square meters)    Stage 3B Moderate CKD (GFR = 30-44 mL/min/1 73 square meters)    Stage 4 Severe CKD (GFR = 15-29 mL/min/1 73 square meters)    Stage 5 End Stage CKD (GFR <15 mL/min/1 73 square meters)  Note: GFR calculation is accurate only with a steady state creatinine    Magnesium [[id number]]  (Normal) Collected: 08/18/22 0618    Lab Status: Final result Specimen: Blood from Arm, Left Updated: 08/18/22 0652     Magnesium 1 8 mg/dL     Phosphorus [[id number]]  (Abnormal) Collected: 08/18/22 0618    Lab Status: Final result Specimen: Blood from Arm, Left Updated: 08/18/22 0652     Phosphorus 1 9 mg/dL     Lipase [[id number]]  (Normal) Collected: 08/18/22 0618    Lab Status: Final result Specimen: Blood from Arm, Left Updated: 08/18/22 0135 Lipase 175 u/L     C-reactive protein [[id number]]  (Abnormal) Collected: 08/18/22 0618    Lab Status: Final result Specimen: Blood from Arm, Left Updated: 08/18/22 0652     CRP 3 2 mg/L     D-dimer, quantitative [[id number]]  (Abnormal) Collected: 08/18/22 0618    Lab Status: Final result Specimen: Blood from Arm, Left Updated: 08/18/22 0650     D-Dimer, Quant 0 68 ug/ml FEU     CK (with reflex to MB) [[id number]]  (Abnormal) Collected: 08/18/22 0618    Lab Status: Final result Specimen: Blood from Arm, Left Updated: 08/18/22 0649     Total  U/L     CKMB [[id number]] Collected: 08/18/22 0618    Lab Status: In process Specimen: Blood from Arm, Left Updated: 08/18/22 0649    CBC and differential [[id number]]  (Normal) Collected: 08/18/22 0618    Lab Status: Final result Specimen: Blood from Arm, Left Updated: 08/18/22 0645     WBC 7 83 Thousand/uL      RBC 4 43 Million/uL      Hemoglobin 13 2 g/dL      Hematocrit 39 4 %      MCV 89 fL      MCH 29 8 pg      MCHC 33 5 g/dL      RDW 12 2 %      MPV 10 9 fL      Platelets 767 Thousands/uL     Narrative: This is an appended report  These results have been appended to a previously verified report  Manual Differential(PHLEBS Do Not Order) [[id number]] Collected: 08/18/22 0618    Lab Status: In process Specimen: Blood from Arm, Left Updated: 08/18/22 0645    UA w Reflex to Microscopic w Reflex to Culture [[id number]]  (Abnormal) Collected: 08/18/22 0625    Lab Status: Final result Specimen: Urine, Clean Catch Updated: 08/18/22 0635     Color, UA Yellow     Clarity, UA Clear     Specific Gravity, UA 1 010     pH, UA 6 0     Leukocytes, UA Small     Nitrite, UA Negative     Protein, UA Negative mg/dl      Glucose, UA Negative mg/dl      Ketones, UA 15 (1+) mg/dl      Urobilinogen, UA 0 2 E U /dl      Bilirubin, UA Negative     Occult Blood, UA Negative    TSH [[id number]] Collected: 08/18/22 0618    Lab Status:  In process Specimen: Blood from Arm, Left Updated: 08/18/22 0634    Methemoglobin [[id number]]  (Normal) Collected: 08/18/22 0618    Lab Status: Final result Specimen: Blood from Arm, Left Updated: 08/18/22 0634     Methemoglobin 0 2 %     Narrative: Therapeutic levels (1 mg/mL and 2 mg/mL) of hydroxocobalamin may interfere with the fCOHb and fMetHb where it may cause lower than expected values    Blood gas, venous [[id number]]  (Abnormal) Collected: 08/18/22 0618    Lab Status: Final result Specimen: Blood from Arm, Left Updated: 08/18/22 0633     pH, Cipriano 7 369     pCO2, Cipriano 33 7 mm Hg      pO2, Cipriano 52 1 mm Hg      HCO3, Cipriano 19 0 mmol/L      Base Excess, Cipriano -5 3 mmol/L      O2 Content, Cipriano 16 8 ml/dL      O2 HGB, VENOUS 84 5 %     HS Troponin 0hr (reflex protocol) [[id number]] Collected: 08/18/22 0618    Lab Status: In process Specimen: Blood from Arm, Left Updated: 08/18/22 0626    Ethanol [[id number]] Collected: 08/18/22 0618    Lab Status: In process Specimen: Blood from Arm, Left Updated: 08/18/22 0626    Rapid drug screen, urine [[id number]] Collected: 08/18/22 0618    Lab Status: In process Specimen: Urine, Clean Catch Updated: 08/18/22 0626    Lactic acid, plasma [[id number]] Collected: 08/18/22 0618    Lab Status: In process Specimen: Blood from Arm, Left Updated: 08/18/22 0626    FLU/RSV/COVID - if FLU/RSV clinically relevant [[id number]] Collected: 08/18/22 0624    Lab Status:  In process Specimen: Nares from Nose Updated: 08/18/22 0625    Fingerstick Glucose (POCT) [[id number]]  (Normal) Collected: 08/18/22 0526    Lab Status: Final result Updated: 08/18/22 0534     POC Glucose 92 mg/dl           XR chest 1 view portable    (Results Pending)   CTA ED chest PE Study    (Results Pending)       Vitals:    08/18/22 0525 08/18/22 0530   BP: 123/81 123/81   TempSrc: Temporal    Pulse: (!) 116 93   Resp: 18 18   Patient Position - Orthostatic VS: Lying    Temp: 97 7 °F (36 5 °C)          MDM  Number of Diagnoses or Management Options  Chest pain, unspecified type  Hypokalemia  Hypophosphatemia  Palpitations  Tachycardia  Diagnosis management comments: 45-year-old female with past medical history pertinent for Suboxone use, current Bactrim use for UTI, previous reported "blood clot in the chest" (though denies "PE") after she had her baby who presents to the emergency department with  for evaluation of palpitations, continued chest pain, arm pain and lower leg pain along with restlessness and feeling like her skin is on fire after being seen at the hospital yesterday after having similar symptoms after patient had her tooth extracted from a dentist today before  Vital signs on arrival here non concerning  Patient has exam as above  EKG showed sinus tachycardia without any acute signs of ischemia  Lab work was ordered along with urine testing and chest x-ray to evaluate further  Patient given IV fluids for supportive care to time  Lab work returned showing a troponin less than 2 and D-dimer though slightly elevated  CT PE study added  Noted CK at 686 and mildly low phosphorus and potassium  Phosphorus and potassium repleted  Patient care was signed out to the next attending physician, Dr Sheila Barraza, at 0700 while pending rest of results and workup         Amount and/or Complexity of Data Reviewed  Clinical lab tests: ordered  Tests in the radiology section of CPT®: ordered  Tests in the medicine section of CPT®: ordered  Obtain history from someone other than the patient: yes  Review and summarize past medical records: yes    Risk of Complications, Morbidity, and/or Mortality  Presenting problems: moderate  Diagnostic procedures: moderate  Management options: moderate        Disposition  Final diagnoses:   Chest pain, unspecified type   Palpitations   Tachycardia   Hypophosphatemia   Hypokalemia     Time reflects when diagnosis was documented in both MDM as applicable and the Disposition within this note     Time User Action Codes Description Comment 8/18/2022  6:13 AM Tammy Salts N Add [R07 9] Chest pain, unspecified type     8/18/2022  6:13 AM Tammy Salts N Add [R00 2] Palpitations     8/18/2022  6:13 AM Tammy Salts N Add [R00 0] Tachycardia     8/18/2022  6:55 AM Tammy Salts N Add [E83 39] Hypophosphatemia     8/18/2022  6:56 AM Chapo Harper Add [E87 6] Hypokalemia       ED Disposition     None      Follow-up Information    None         Patient's Medications   Discharge Prescriptions    No medications on file       No discharge procedures on file      PDMP Review     None          ED Provider  Electronically Signed by           Rose Mcguire MD  08/18/22 8079 Rafi Mederos MD  08/18/22 9937

## 2022-08-18 NOTE — DISCHARGE INSTRUCTIONS
Please discontinue the Bactrim and start taking the Cipro prescribed      Please follow-up with your primary care physician or with the primary care physician listed below

## 2022-08-18 NOTE — ED CARE HANDOFF
Emergency Department Sign Out Note        Sign out and transfer of care from Dr Manolo Toledo at 7:00 a m  Dionne Baxter See Separate Emergency Department note  The patient, Jj Greer, was evaluated by the previous provider for palpitations  Workup Completed:  Initial evaluation and blood work  ED Course / Workup Pending (followup): At the time I received sign-out, patient was stable in awaiting results of CTA chest and repeat troponin as well as IV hydration  CTA chest subsequently negative, repeat troponin negative  Patient currently taking Bactrim for treatment of urinary tract infection, appears to still have urinary tract infection  However no fevers or white count  Heart rates improved after IV hydration  Have advised patient to discontinue Bactrim use as possibly resistant urinary tract infection and will prescribe Cipro instead  Patient otherwise stable for discharge at this time  Patient provided with follow-up information for local primary care        HEART Risk Score    Flowsheet Row Most Recent Value   Heart Score Risk Calculator    History 0 Filed at: 08/18/2022 0654   ECG 0 Filed at: 08/18/2022 0654   Age 0 Filed at: 08/18/2022 0654   Risk Factors 1 Filed at: 08/18/2022 0654   Troponin 0 Filed at: 08/18/2022 0654   HEART Score 1 Filed at: 08/18/2022 4583                        Wells' Criteria for PE    Flowsheet Row Most Recent Value   Wells' Criteria for PE    Clinical signs and symptoms of DVT 0 Filed at: 08/18/2022 4168   PE is primary diagnosis or equally likely 3 Filed at: 08/18/2022 0652   HR >100 1 5 Filed at: 08/18/2022 6620   Immobilization at least 3 days or Surgery in the previous 4 weeks 0 Filed at: 08/18/2022 7005   Previous, objectively diagnosed PE or DVT 1 5 Filed at: 08/18/2022 0695   Hemoptysis 0 Filed at: 08/18/2022 5374   Malignancy with treatment within 6 months or palliative 0 Filed at: 08/18/2022 0150   Wells' Criteria Total 6 Filed at: 08/18/2022 4606          CTA ED chest PE Study   Final Result      No acute pathology  No pulmonary embolus  Workstation performed: [id number]         XR chest 1 view portable   Final Result      No acute cardiopulmonary disease                    Workstation performed: [id number]           Results for orders placed or performed during the hospital encounter of 08/18/22   FLU/RSV/COVID - if FLU/RSV clinically relevant    Specimen: Nose; Nares   Result Value Ref Range    SARS-CoV-2 Negative Negative    INFLUENZA A PCR Negative Negative    INFLUENZA B PCR Negative Negative    RSV PCR Negative Negative   CBC and differential   Result Value Ref Range    WBC 7 83 4 31 - 10 16 Thousand/uL    RBC 4 43 3 81 - 5 12 Million/uL    Hemoglobin 13 2 11 5 - 15 4 g/dL    Hematocrit 39 4 34 8 - 46 1 %    MCV 89 82 - 98 fL    MCH 29 8 26 8 - 34 3 pg    MCHC 33 5 31 4 - 37 4 g/dL    RDW 12 2 11 6 - 15 1 %    MPV 10 9 8 9 - 12 7 fL    Platelets 390 426 - 727 Thousands/uL   Comprehensive metabolic panel   Result Value Ref Range    Sodium 141 135 - 147 mmol/L    Potassium 3 3 (L) 3 5 - 5 3 mmol/L    Chloride 105 96 - 108 mmol/L    CO2 20 (L) 21 - 32 mmol/L    ANION GAP 16 (H) 4 - 13 mmol/L    BUN 6 5 - 25 mg/dL    Creatinine 0 67 0 60 - 1 30 mg/dL    Glucose 86 65 - 140 mg/dL    Calcium 8 6 8 3 - 10 1 mg/dL    AST 37 5 - 45 U/L    ALT 21 12 - 78 U/L    Alkaline Phosphatase 46 46 - 116 U/L    Total Protein 7 5 6 4 - 8 4 g/dL    Albumin 4 2 3 5 - 5 0 g/dL    Total Bilirubin 0 61 0 20 - 1 00 mg/dL    eGFR 122 ml/min/1 73sq m   Magnesium   Result Value Ref Range    Magnesium 1 8 1 6 - 2 6 mg/dL   Phosphorus   Result Value Ref Range    Phosphorus 1 9 (L) 2 7 - 4 5 mg/dL   Methemoglobin   Result Value Ref Range    Methemoglobin 0 2 0 1 - 1 5 %   Blood gas, venous   Result Value Ref Range    pH, Cipriano 7 369 7 300 - 7 400    pCO2, Cipriano 33 7 (L) 42 0 - 50 0 mm Hg    pO2, Cipriano 52 1 (H) 35 0 - 45 0 mm Hg    HCO3, Cipriano 19 0 (L) 24 - 30 mmol/L    Base Excess, Cipriano -5 3 mmol/L O2 Content, Cipriano 16 8 ml/dL    O2 HGB, VENOUS 84 5 (H) 60 0 - 80 0 %   D-dimer, quantitative   Result Value Ref Range    D-Dimer, Quant 0 68 (H) <0 50 ug/ml FEU   UA w Reflex to Microscopic w Reflex to Culture    Specimen: Urine, Clean Catch   Result Value Ref Range    Color, UA Yellow     Clarity, UA Clear     Specific [place], UA 1 010 1 003 - 1 030    pH, UA 6 0 4 5, 5 0, 5 5, 6 0, 6 5, 7 0, 7 5, 8 0    Leukocytes, UA Small (A) Negative    Nitrite, UA Negative Negative    Protein, UA Negative Negative mg/dl    Glucose, UA Negative Negative mg/dl    Ketones, UA 15 (1+) (A) Negative mg/dl    Urobilinogen, UA 0 2 0 2, 1 0 E U /dl E U /dl    Bilirubin, UA Negative Negative    Occult Blood, UA Negative Negative   Rapid drug screen, urine   Result Value Ref Range    Amph/Meth UR Negative Negative    Barbiturate Ur Negative Negative    Benzodiazepine Urine Positive (A) Negative    Cocaine Urine Negative Negative    Methadone Urine Negative Negative    Opiate Urine Negative Negative    PCP Ur Negative Negative    THC Urine Positive (A) Negative    Oxycodone Urine Negative Negative   Ethanol   Result Value Ref Range    Ethanol Lvl 36 (H) 0 - 3 mg/dL   HS Troponin 0hr (reflex protocol)   Result Value Ref Range    hs TnI 0hr 6 "Refer to ACS Flowchart"- see link ng/L   Lipase   Result Value Ref Range    Lipase 175 73 - 393 u/L   Lactic acid, plasma   Result Value Ref Range    LACTIC ACID 2 9 (HH) 0 5 - 2 0 mmol/L   TSH   Result Value Ref Range    TSH 3RD GENERATON 0 728 0 450 - 4 500 uIU/mL   CK (with reflex to MB)   Result Value Ref Range    Total  (H) 26 - 192 U/L   C-reactive protein   Result Value Ref Range    CRP 3 2 (H) <3 0 mg/L   Urine Microscopic   Result Value Ref Range    RBC, UA 2-4 None Seen, 0-1, 1-2, 2-4, 0-5 /hpf    WBC, UA 10-20 (A) None Seen, 0-1, 1-2, 0-5, 2-4 /hpf    Epithelial Cells Moderate (A) None Seen, Occasional /hpf    Bacteria, UA Moderate (A) None Seen, Occasional /hpf   CKMB   Result Value Ref Range    CK-MB Index <1 0 0 0 - 2 5 %    CK-MB 4 5 0 0 - 5 0 ng/mL   HS Troponin I 2hr   Result Value Ref Range    hs TnI 2hr 8 "Refer to ACS Flowchart"- see link ng/L    Delta 2hr hsTnI 2 <20 ng/L   Lactic acid 2 Hours   Result Value Ref Range    LACTIC ACID 1 9 0 5 - 2 0 mmol/L   ECG 12 lead   Result Value Ref Range    Ventricular Rate 102 BPM    Atrial Rate 102 BPM    MO Interval 164 ms    QRSD Interval 86 ms    QT Interval 356 ms    QTC Interval 463 ms    P [place] 64 degrees    QRS Axis 97 degrees    T Wave Axis 58 degrees   Fingerstick Glucose (POCT)   Result Value Ref Range    POC Glucose 92 65 - 140 mg/dl   Manual Differential(PHLEBS Do Not Order)   Result Value Ref Range    Segmented % 70 43 - 75 %    Lymphocytes % 13 (L) 14 - 44 %    Monocytes % 6 4 - 12 %    Eosinophils, % 1 0 - 6 %    Basophils % 0 0 - 1 %    Atypical Lymphocytes % 10 (H) <=0 %    Absolute Neutrophils 5 48 1 85 - 7 62 Thousand/uL    Lymphocytes Absolute 1 02 0 60 - 4 47 Thousand/uL    Monocytes Absolute 0 47 0 00 - 1 22 Thousand/uL    Eosinophils Absolute 0 08 0 00 - 0 40 Thousand/uL    Basophils Absolute 0 00 0 00 - 0 10 Thousand/uL    Total Counted      RBC Morphology Normal     Platelet Estimate Adequate Adequate              Procedures  MDM        Disposition  Final diagnoses:   Chest pain, unspecified type   Palpitations   Tachycardia   Hypophosphatemia   Hypokalemia   UTI (urinary tract infection)     Time reflects when diagnosis was documented in both MDM as applicable and the Disposition within this note     Time User Action Codes Description Comment    8/18/2022  6:13 AM Ranny Ilya N Add [R07 9] Chest pain, unspecified type     8/18/2022  6:13 AM Ranny Ilya N Add [R00 2] Palpitations     8/18/2022  6:13 AM Ranny Ilya N Add [R00 0] Tachycardia     8/18/2022  6:55 AM Ranny Ilya N Add [E83 39] Hypophosphatemia     8/18/2022  6:56 AM Ranny Ilya N Add [E87 6] Hypokalemia     8/18/2022  9:44 AM Jain Guard Add [N39 0] UTI (urinary tract infection)       ED Disposition     ED Disposition   Discharge    Condition   Stable    Date/Time   Thu Aug 18, 2022  9:44 AM    Comment   Raul Furlong discharge to home/self care  Follow-up Information     Follow up With Specialties Details Why [address]  Call   [address] [facility] [address]  [phone]        Patient's Medications   Discharge Prescriptions    CIPROFLOXACIN (CIPRO) 500 MG TABLET    Take 1 tablet (500 mg total) by mouth 2 (two) times a day for 5 days       Start Date: 8/18/2022 End Date: 8/23/2022       Order Dose: 500 mg       Quantity: 14 tablet    Refills: 0     No discharge procedures on file         ED Provider  Electronically Signed by     Rita Brock MD  08/18/22 2043

## 2022-08-19 LAB — BACTERIA UR CULT: NORMAL

## 2022-08-20 ENCOUNTER — HOSPITAL ENCOUNTER (EMERGENCY)
Facility: HOSPITAL | Age: 27
Discharge: HOME | End: 2022-08-20
Attending: EMERGENCY MEDICINE
Payer: COMMERCIAL

## 2022-08-20 VITALS
DIASTOLIC BLOOD PRESSURE: 72 MMHG | OXYGEN SATURATION: 97 % | SYSTOLIC BLOOD PRESSURE: 112 MMHG | WEIGHT: 127 LBS | HEART RATE: 68 BPM | TEMPERATURE: 98 F | RESPIRATION RATE: 8 BRPM

## 2022-08-20 DIAGNOSIS — E87.6 HYPOKALEMIA: ICD-10-CM

## 2022-08-20 DIAGNOSIS — M79.10 MYALGIA: ICD-10-CM

## 2022-08-20 DIAGNOSIS — N39.0 UTI (URINARY TRACT INFECTION): Primary | ICD-10-CM

## 2022-08-20 LAB
ALBUMIN SERPL BCP-MCNC: 4.1 G/DL (ref 3.5–5)
ALP SERPL-CCNC: 44 U/L (ref 46–116)
ALT SERPL W P-5'-P-CCNC: 20 U/L (ref 12–78)
ANION GAP SERPL CALCULATED.3IONS-SCNC: 10 MMOL/L (ref 4–13)
AST SERPL W P-5'-P-CCNC: 22 U/L (ref 5–45)
BACTERIA UR QL AUTO: ABNORMAL /HPF
BASE EX.OXY STD BLDV CALC-SCNC: 80.3 % (ref 60–80)
BASE EXCESS BLDV CALC-SCNC: -3 MMOL/L
BASOPHILS # BLD MANUAL: 0 THOUSAND/UL (ref 0–0.1)
BASOPHILS NFR MAR MANUAL: 0 % (ref 0–1)
BILIRUB SERPL-MCNC: 0.41 MG/DL (ref 0.2–1)
BILIRUB UR QL STRIP: ABNORMAL
BUN SERPL-MCNC: 9 MG/DL (ref 5–25)
CALCIUM SERPL-MCNC: 8.6 MG/DL (ref 8.3–10.1)
CARDIAC TROPONIN I PNL SERPL HS: 2 NG/L
CHLORIDE SERPL-SCNC: 105 MMOL/L (ref 96–108)
CK MB SERPL-MCNC: <1 % (ref 0–2.5)
CK MB SERPL-MCNC: <1 NG/ML (ref 0–5)
CK SERPL-CCNC: 155 U/L (ref 26–192)
CLARITY UR: CLEAR
CO2 SERPL-SCNC: 26 MMOL/L (ref 21–32)
COLOR UR: ABNORMAL
CREAT SERPL-MCNC: 0.75 MG/DL (ref 0.6–1.3)
EOSINOPHIL # BLD MANUAL: 0.26 THOUSAND/UL (ref 0–0.4)
EOSINOPHIL NFR BLD MANUAL: 4 % (ref 0–6)
ERYTHROCYTE [DISTWIDTH] IN BLOOD BY AUTOMATED COUNT: 12.4 % (ref 11.6–15.1)
EXT PREG TEST URINE: NEGATIVE
EXT. CONTROL ED NAV: NORMAL
FLUAV RNA RESP QL NAA+PROBE: NEGATIVE
FLUBV RNA RESP QL NAA+PROBE: NEGATIVE
GFR SERPL CREATININE-BSD FRML MDRD: 111 ML/MIN/1.73SQ M
GLUCOSE SERPL-MCNC: 95 MG/DL (ref 65–140)
GLUCOSE UR STRIP-MCNC: ABNORMAL MG/DL
HCO3 BLDV-SCNC: 21.8 MMOL/L (ref 24–30)
HCT VFR BLD AUTO: 36.9 % (ref 34.8–46.1)
HGB BLD-MCNC: 12.6 G/DL (ref 11.5–15.4)
HGB UR QL STRIP.AUTO: NEGATIVE
KETONES UR STRIP-MCNC: ABNORMAL MG/DL
LACTATE SERPL-SCNC: 0.9 MMOL/L (ref 0.5–2)
LEUKOCYTE ESTERASE UR QL STRIP: ABNORMAL
LIPASE SERPL-CCNC: 233 U/L (ref 73–393)
LYMPHOCYTES # BLD AUTO: 1.25 THOUSAND/UL (ref 0.6–4.47)
LYMPHOCYTES # BLD AUTO: 19 % (ref 14–44)
MAGNESIUM SERPL-MCNC: 1.6 MG/DL (ref 1.6–2.6)
MCH RBC QN AUTO: 29.9 PG (ref 26.8–34.3)
MCHC RBC AUTO-ENTMCNC: 34.1 G/DL (ref 31.4–37.4)
MCV RBC AUTO: 87 FL (ref 82–98)
MONOCYTES # BLD AUTO: 0.53 THOUSAND/UL (ref 0–1.22)
MONOCYTES NFR BLD: 8 % (ref 4–12)
NEUTROPHILS # BLD MANUAL: 4.34 THOUSAND/UL (ref 1.85–7.62)
NEUTS SEG NFR BLD AUTO: 66 % (ref 43–75)
NITRITE UR QL STRIP: POSITIVE
NON-SQ EPI CELLS URNS QL MICRO: ABNORMAL /HPF
O2 CT BLDV-SCNC: 15 ML/DL
PCO2 BLDV: 38 MM HG (ref 42–50)
PH BLDV: 7.38 [PH] (ref 7.3–7.4)
PH UR STRIP.AUTO: 6.5 [PH]
PHOSPHATE SERPL-MCNC: 3.2 MG/DL (ref 2.7–4.5)
PLATELET # BLD AUTO: 206 THOUSANDS/UL (ref 149–390)
PLATELET BLD QL SMEAR: ADEQUATE
PMV BLD AUTO: 10.9 FL (ref 8.9–12.7)
PO2 BLDV: 48.9 MM HG (ref 35–45)
POTASSIUM SERPL-SCNC: 3.2 MMOL/L (ref 3.5–5.3)
PROT SERPL-MCNC: 7.2 G/DL (ref 6.4–8.4)
PROT UR STRIP-MCNC: ABNORMAL MG/DL
RBC # BLD AUTO: 4.22 MILLION/UL (ref 3.81–5.12)
RBC #/AREA URNS AUTO: ABNORMAL /HPF
RBC MORPH BLD: PRESENT
RSV RNA RESP QL NAA+PROBE: NEGATIVE
SARS-COV-2 RNA RESP QL NAA+PROBE: NEGATIVE
SODIUM SERPL-SCNC: 141 MMOL/L (ref 135–147)
SP GR UR STRIP.AUTO: >=1.03 (ref 1–1.03)
TSH SERPL DL<=0.05 MIU/L-ACNC: 1.17 UIU/ML (ref 0.45–4.5)
UROBILINOGEN UR QL STRIP.AUTO: 4 E.U./DL
VARIANT LYMPHS # BLD AUTO: 3 %
WBC # BLD AUTO: 6.57 THOUSAND/UL (ref 4.31–10.16)
WBC #/AREA URNS AUTO: ABNORMAL /HPF

## 2022-08-20 PROCEDURE — 82805 BLOOD GASES W/O2 SATURATION: CPT | Performed by: EMERGENCY MEDICINE

## 2022-08-20 PROCEDURE — 80053 COMPREHEN METABOLIC PANEL: CPT | Performed by: EMERGENCY MEDICINE

## 2022-08-20 PROCEDURE — 82550 ASSAY OF CK (CPK): CPT | Performed by: EMERGENCY MEDICINE

## 2022-08-20 PROCEDURE — 83690 ASSAY OF LIPASE: CPT | Performed by: EMERGENCY MEDICINE

## 2022-08-20 PROCEDURE — 82553 CREATINE MB FRACTION: CPT | Performed by: EMERGENCY MEDICINE

## 2022-08-20 PROCEDURE — 36415 COLL VENOUS BLD VENIPUNCTURE: CPT | Performed by: EMERGENCY MEDICINE

## 2022-08-20 PROCEDURE — 84100 ASSAY OF PHOSPHORUS: CPT | Performed by: EMERGENCY MEDICINE

## 2022-08-20 PROCEDURE — 83605 ASSAY OF LACTIC ACID: CPT | Performed by: EMERGENCY MEDICINE

## 2022-08-20 PROCEDURE — 96361 HYDRATE IV INFUSION ADD-ON: CPT

## 2022-08-20 PROCEDURE — 81025 URINE PREGNANCY TEST: CPT | Performed by: EMERGENCY MEDICINE

## 2022-08-20 PROCEDURE — 84484 ASSAY OF TROPONIN QUANT: CPT | Performed by: EMERGENCY MEDICINE

## 2022-08-20 PROCEDURE — 0241U HB NFCT DS VIR RESP RNA 4 TRGT: CPT | Performed by: EMERGENCY MEDICINE

## 2022-08-20 PROCEDURE — 85007 BL SMEAR W/DIFF WBC COUNT: CPT | Performed by: EMERGENCY MEDICINE

## 2022-08-20 PROCEDURE — 99284 EMERGENCY DEPT VISIT MOD MDM: CPT

## 2022-08-20 PROCEDURE — 93005 ELECTROCARDIOGRAM TRACING: CPT

## 2022-08-20 PROCEDURE — 85027 COMPLETE CBC AUTOMATED: CPT | Performed by: EMERGENCY MEDICINE

## 2022-08-20 PROCEDURE — 81001 URINALYSIS AUTO W/SCOPE: CPT | Performed by: EMERGENCY MEDICINE

## 2022-08-20 PROCEDURE — 85025 COMPLETE CBC W/AUTO DIFF WBC: CPT | Performed by: EMERGENCY MEDICINE

## 2022-08-20 PROCEDURE — 84443 ASSAY THYROID STIM HORMONE: CPT | Performed by: EMERGENCY MEDICINE

## 2022-08-20 PROCEDURE — 96375 TX/PRO/DX INJ NEW DRUG ADDON: CPT

## 2022-08-20 PROCEDURE — 83735 ASSAY OF MAGNESIUM: CPT | Performed by: EMERGENCY MEDICINE

## 2022-08-20 PROCEDURE — 96365 THER/PROPH/DIAG IV INF INIT: CPT

## 2022-08-20 PROCEDURE — 86618 LYME DISEASE ANTIBODY: CPT | Performed by: EMERGENCY MEDICINE

## 2022-08-20 PROCEDURE — 99285 EMERGENCY DEPT VISIT HI MDM: CPT | Performed by: EMERGENCY MEDICINE

## 2022-08-20 RX ORDER — CEPHALEXIN 500 MG/1
500 CAPSULE ORAL EVERY 6 HOURS SCHEDULED
Qty: 20 CAPSULE | Refills: 0 | Status: SHIPPED | OUTPATIENT
Start: 2022-08-20 | End: 2022-08-25

## 2022-08-20 RX ORDER — KETOROLAC TROMETHAMINE 30 MG/ML
15 INJECTION, SOLUTION INTRAMUSCULAR; INTRAVENOUS ONCE
Status: COMPLETED | OUTPATIENT
Start: 2022-08-20 | End: 2022-08-20

## 2022-08-20 RX ORDER — CEFTRIAXONE 1 G/50ML
1000 INJECTION, SOLUTION INTRAVENOUS ONCE
Status: COMPLETED | OUTPATIENT
Start: 2022-08-20 | End: 2022-08-20

## 2022-08-20 RX ORDER — POTASSIUM CHLORIDE 20 MEQ/1
40 TABLET, EXTENDED RELEASE ORAL ONCE
Status: COMPLETED | OUTPATIENT
Start: 2022-08-20 | End: 2022-08-20

## 2022-08-20 RX ADMIN — CEFTRIAXONE 1000 MG: 1 INJECTION, SOLUTION INTRAVENOUS at 12:34

## 2022-08-20 RX ADMIN — POTASSIUM CHLORIDE 40 MEQ: 1500 TABLET, EXTENDED RELEASE ORAL at 12:34

## 2022-08-20 RX ADMIN — KETOROLAC TROMETHAMINE 15 MG: 30 INJECTION, SOLUTION INTRAMUSCULAR at 11:42

## 2022-08-20 RX ADMIN — SODIUM CHLORIDE 1000 ML: 0.9 INJECTION, SOLUTION INTRAVENOUS at 11:40

## 2022-08-20 NOTE — ED PROVIDER NOTES
History  Chief Complaint   Patient presents with    Generalized Body Aches     Pt reports pain in upper and lower extremities, increased urinary urgency for one day  Was seen here Wednesday, treated for UTI     Patient tooth extraction on Monday  Seen here Wednesday for body aches and UTI  Noted to have a CK of 696  Placed on antibiotics for UTI  Still has body aches  Feels weak all over  States her whole body hurts  No fevers or chills  No nausea or vomiting or diarrhea since Tuesday  History provided by:  Patient   used: No    Fatigue  Severity:  Mild  Onset quality:  Gradual  Duration:  4 days  Timing:  Constant  Progression:  Worsening  Chronicity:  New  Context: recent infection    Context: not allergies and not increased activity    Relieved by:  Nothing  Worsened by:  Nothing  Ineffective treatments:  None tried  Associated symptoms: lethargy, myalgias, nausea and vomiting    Associated symptoms: no abdominal pain, no chest pain, no cough, no diarrhea, no dizziness, no drooling, no dysuria, no fever, no foul-smelling urine, no frequency, no headaches, no seizures, no shortness of breath, no syncope and no urgency        Prior to Admission Medications   Prescriptions Last Dose Informant Patient Reported? Taking?    Lidocaine Viscous HCl (XYLOCAINE) 2 % mucosal solution   Yes No   Sig: 15 mL by Transmucosal route   buprenorphine-naloxone (Suboxone) 8-2 mg   Yes No   Sig: Place 2 Film under the tongue daily    ciprofloxacin (CIPRO) 500 mg tablet   No No   Sig: Take 1 tablet (500 mg total) by mouth 2 (two) times a day for 5 days   norgestimate-ethinyl estradiol (ORTHO-CYCLEN) 0 25-35 MG-MCG per tablet   Yes No   Sig: Take 1 tablet by mouth daily    Patient not taking: Reported on 8/18/2022   phenazopyridine (PYRIDIUM) 200 mg tablet   No No   Sig: Take 1 tablet (200 mg total) by mouth 3 (three) times a day   Patient not taking: Reported on 8/18/2022      Facility-Administered Medications: None       History reviewed  No pertinent past medical history  History reviewed  No pertinent surgical history  History reviewed  No pertinent family history  I have reviewed and agree with the history as documented  E-Cigarette/Vaping    E-Cigarette Use Never User      E-Cigarette/Vaping Substances     Social History     Tobacco Use    Smoking status: Current Every Day Smoker     Packs/day: 1 00    Smokeless tobacco: Never Used   Vaping Use    Vaping Use: Never used   Substance Use Topics    Alcohol use: No    Drug use: No       Review of Systems   Constitutional: Positive for fatigue  Negative for chills and fever  HENT: Negative for drooling, ear pain, hearing loss, sore throat, trouble swallowing and voice change  Eyes: Negative for pain and discharge  Respiratory: Negative for cough, shortness of breath and wheezing  Cardiovascular: Negative for chest pain, palpitations and syncope  Gastrointestinal: Positive for nausea and vomiting  Negative for abdominal pain, blood in stool, constipation and diarrhea  Genitourinary: Negative for dysuria, flank pain, frequency, hematuria and urgency  Musculoskeletal: Positive for myalgias  Negative for joint swelling, neck pain and neck stiffness  Skin: Negative for rash and wound  Neurological: Positive for weakness  Negative for dizziness, seizures, syncope, facial asymmetry and headaches  Psychiatric/Behavioral: Negative for hallucinations, self-injury and suicidal ideas  All other systems reviewed and are negative  Physical Exam  Physical Exam  Vitals and nursing note reviewed  Constitutional:       General: She is not in acute distress  Appearance: She is well-developed  HENT:      Head: Normocephalic and atraumatic  Right Ear: External ear normal       Left Ear: External ear normal       Mouth/Throat:      Comments: Gum appears to be healing well were tooth was extracted    There is no swelling or fluctuance noted  There is no discharge  Minimally tender palpation  Eyes:      General: No scleral icterus  Right eye: No discharge  Left eye: No discharge  Extraocular Movements: Extraocular movements intact  Conjunctiva/sclera: Conjunctivae normal    Cardiovascular:      Rate and Rhythm: Normal rate and regular rhythm  Heart sounds: Normal heart sounds  No murmur heard  Pulmonary:      Effort: Pulmonary effort is normal       Breath sounds: Normal breath sounds  No wheezing or rales  Abdominal:      General: Bowel sounds are normal  There is no distension  Palpations: Abdomen is soft  Tenderness: There is no abdominal tenderness  There is no guarding or rebound  Musculoskeletal:         General: No deformity  Normal range of motion  Cervical back: Normal range of motion and neck supple  Skin:     General: Skin is warm and dry  Findings: No rash  Neurological:      General: No focal deficit present  Mental Status: She is alert and oriented to person, place, and time  Cranial Nerves: No cranial nerve deficit  Psychiatric:         Mood and Affect: Mood normal          Behavior: Behavior normal          Thought Content:  Thought content normal          Judgment: Judgment normal          Vital Signs  ED Triage Vitals [08/20/22 1112]   Temperature Pulse Respirations Blood Pressure SpO2   98 °F (36 7 °C) (!) 114 16 117/92 97 %      Temp Source Heart Rate Source Patient Position - Orthostatic VS BP Location FiO2 (%)   Temporal -- Lying Right arm --      Pain Score       7           Vitals:    08/20/22 1112   BP: 117/92   Pulse: (!) 114   Patient Position - Orthostatic VS: Lying         Visual Acuity      ED Medications  Medications   sodium chloride 0 9 % bolus 1,000 mL (1,000 mL Intravenous New Bag 8/20/22 1140)   cefTRIAXone (ROCEPHIN) IVPB (premix in dextrose) 1,000 mg 50 mL (has no administration in time range)   potassium chloride (K-DUR,KLOR-CON) CR tablet 40 mEq (has no administration in time range)   ketorolac (TORADOL) injection 15 mg (15 mg Intravenous Given 8/20/22 1142)       Diagnostic Studies  Results Reviewed     Procedure Component Value Units Date/Time    FLU/RSV/COVID - if FLU/RSV clinically relevant [[id number]]  (Normal) Collected: 08/20/22 1149    Lab Status: Final result Specimen: Nares from Nose Updated: 08/20/22 1231     SARS-CoV-2 Negative     INFLUENZA A PCR Negative     INFLUENZA B PCR Negative     RSV PCR Negative    Narrative:      FOR PEDIATRIC PATIENTS - copy/paste COVID Guidelines URL to browser: [email or web address]  [email or web address]    SARS-CoV-2 assay is a Nucleic Acid Amplification assay intended for the  qualitative detection of nucleic acid from SARS-CoV-2 in nasopharyngeal  swabs  Results are for the presumptive identification of SARS-CoV-2 RNA  Positive results are indicative of infection with SARS-CoV-2, the virus  causing COVID-19, but do not rule out bacterial infection or co-infection  with other viruses  Laboratories within the [place] and its  territories are required to report all positive results to the appropriate  public health authorities  Negative results do not preclude SARS-CoV-2  infection and should not be used as the sole basis for treatment or other  patient management decisions  Negative results must be combined with  clinical observations, patient history, and epidemiological information  This test has not been FDA cleared or approved  This test has been authorized by FDA under an Emergency Use Authorization  (EUA)  This test is only authorized for the duration of time the  declaration that circumstances exist justifying the authorization of the  emergency use of an in vitro diagnostic tests for detection of SARS-CoV-2  virus and/or diagnosis of COVID-19 infection under section 564(b)(1) of  the Act, 21 U  S C  904SDC-7(O)(2), unless the authorization is terminated  or revoked sooner  The test has been validated but independent review by FDA  and CLIA is pending  Test performed using [employer] Gene[employer]: This RT-PCR assay targets N2,  a region unique to SARS-CoV-2  A conserved region in the E-gene was chosen  for pan-Sarbecovirus detection which includes SARS-CoV-2      Comprehensive metabolic panel [[id number]]  (Abnormal) Collected: 08/20/22 1136    Lab Status: Final result Specimen: Blood from Arm, Right Updated: 08/20/22 1221     Sodium 141 mmol/L      Potassium 3 2 mmol/L      Chloride 105 mmol/L      CO2 26 mmol/L      ANION GAP 10 mmol/L      BUN 9 mg/dL      Creatinine 0 75 mg/dL      Glucose 95 mg/dL      Calcium 8 6 mg/dL      AST 22 U/L      ALT 20 U/L      Alkaline Phosphatase 44 U/L      Total Protein 7 2 g/dL      Albumin 4 1 g/dL      Total Bilirubin 0 41 mg/dL      eGFR 111 ml/min/1 73sq m     Narrative:      [place] guidelines for Chronic Kidney Disease (CKD):     Stage 1 with normal or high GFR (GFR > 90 mL/min/1 73 square meters)    Stage 2 Mild CKD (GFR = 60-89 mL/min/1 73 square meters)    Stage 3A Moderate CKD (GFR = 45-59 mL/min/1 73 square meters)    Stage 3B Moderate CKD (GFR = 30-44 mL/min/1 73 square meters)    Stage 4 Severe CKD (GFR = 15-29 mL/min/1 73 square meters)    Stage 5 End Stage CKD (GFR <15 mL/min/1 73 square meters)  Note: GFR calculation is accurate only with a steady state creatinine    Lipase [[id number]]  (Normal) Collected: 08/20/22 1136    Lab Status: Final result Specimen: Blood from Arm, Right Updated: 08/20/22 1221     Lipase 233 u/L     Phosphorus [[id number]]  (Normal) Collected: 08/20/22 1136    Lab Status: Final result Specimen: Blood from Arm, Right Updated: 08/20/22 1221     Phosphorus 3 2 mg/dL     Magnesium [[id number]]  (Normal) Collected: 08/20/22 1136    Lab Status: Final result Specimen: Blood from Arm, Right Updated: 08/20/22 1221     Magnesium 1 6 mg/dL Manual Differential(PHLEBS Do Not Order) [[id number]]  (Abnormal) Collected: 08/20/22 1136    Lab Status: Final result Specimen: Blood from Arm, Right Updated: 08/20/22 1220     Segmented % 66 %      Lymphocytes % 19 %      Monocytes % 8 %      Eosinophils, % 4 %      Basophils % 0 %      Atypical Lymphocytes % 3 %      Absolute Neutrophils 4 34 Thousand/uL      Lymphocytes Absolute 1 25 Thousand/uL      Monocytes Absolute 0 53 Thousand/uL      Eosinophils Absolute 0 26 Thousand/uL      Basophils Absolute 0 00 Thousand/uL      Total Counted --     RBC Morphology Present     Platelet Estimate Adequate    TSH [[id number]]  (Normal) Collected: 08/20/22 1136    Lab Status: Final result Specimen: Blood from Arm, Right Updated: 08/20/22 1219     TSH 3RD GENERATON 1 172 uIU/mL     Narrative:      Patients undergoing fluorescein dye angiography may retain small amounts of fluorescein in the body for 48-72 hours post procedure  Samples containing fluorescein can produce falsely depressed TSH values  If the patient had this procedure,a specimen should be resubmitted post fluorescein clearance  HS Troponin 0hr (reflex protocol) [[id number]]  (Normal) Collected: 08/20/22 1136    Lab Status: Final result Specimen: Blood from Arm, Right Updated: 08/20/22 1217     hs TnI 0hr 2 ng/L     Lactic acid [[id number]]  (Normal) Collected: 08/20/22 1136    Lab Status: Final result Specimen: Blood from Arm, Right Updated: 08/20/22 1214     LACTIC ACID 0 9 mmol/L     Narrative:      Result may be elevated if tourniquet was used during collection  CK Total with Reflex CKMB [[id number]]  (Normal) Collected: 08/20/22 1136    Lab Status: Final result Specimen: Blood from Arm, Right Updated: 08/20/22 1211     Total  U/L     CKMB [[id number]] Collected: 08/20/22 1136    Lab Status:  In process Specimen: Blood from Arm, Right Updated: 08/20/22 1211    CBC and differential [[id number]]  (Normal) Collected: 08/20/22 1136    Lab Status: Final result Specimen: Blood from Arm, Right Updated: 08/20/22 1205     WBC 6 57 Thousand/uL      RBC 4 22 Million/uL      Hemoglobin 12 6 g/dL      Hematocrit 36 9 %      MCV 87 fL      MCH 29 9 pg      MCHC 34 1 g/dL      RDW 12 4 %      MPV 10 9 fL      Platelets 813 Thousands/uL     Narrative: This is an appended report  These results have been appended to a previously verified report  Urine Microscopic [[id number]]  (Abnormal) Collected: 08/20/22 1137    Lab Status: Final result Specimen: Urine, Clean Catch Updated: 08/20/22 1205     RBC, UA 0-1 /hpf      WBC, UA 2-4 /hpf      Epithelial Cells Moderate /hpf      Bacteria, UA Moderate /hpf     UA w Reflex to Microscopic w Reflex to Culture [[id number]]  (Abnormal) Collected: 08/20/22 1137    Lab Status: Final result Specimen: Urine, Clean Catch Updated: 08/20/22 1152     Color, UA Orange     Clarity, UA Clear     Specific Gravity, UA >=1 030     pH, UA 6 5     Leukocytes, UA Small     Nitrite, UA Positive     Protein, UA 30 (1+) mg/dl      Glucose,  (1/10%) mg/dl      Ketones, UA 40 (2+) mg/dl      Urobilinogen, UA 4 0 E U /dl      Bilirubin, UA Small     Occult Blood, UA Negative    Blood gas, venous [[id number]]  (Abnormal) Collected: 08/20/22 1136    Lab Status: Final result Specimen: Blood from Arm, Right Updated: 08/20/22 1149     pH, Cipriano 7 376     pCO2, Cipriano 38 0 mm Hg      pO2, Cipriano 48 9 mm Hg      HCO3, Cipriano 21 8 mmol/L      Base Excess, Cipriano -3 0 mmol/L      O2 Content, Cipriano 15 0 ml/dL      O2 HGB, VENOUS 80 3 %     Lyme Antibody Profile with reflex to [facility] [[id number]] Collected: 08/20/22 1136    Lab Status:  In process Specimen: Blood from Arm, Right Updated: 08/20/22 1143    POCT pregnancy, urine [[id number]]  (Normal) Resulted: 08/20/22 1137    Lab Status: Final result Updated: 08/20/22 1139     EXT PREG TEST UR (Ref: Negative) negative     Control valid                 No orders to display              Procedures  ECG 12 Lead Documentation Only    Date/Time: 8/20/2022 11:23 AM  Performed by: Alexa Ayon MD  Authorized by: Alexa Ayon MD     ECG reviewed by me, the ED Provider: yes    Patient location:  ED  Previous ECG:     Previous ECG:  Compared to current    Similarity:  No change  Rate:     ECG rate:  80  Rhythm:     Rhythm: sinus rhythm    Ectopy:     Ectopy: none    QRS:     QRS axis:  Normal             ED Course                               SBIRT 20yo+    Flowsheet Row Most Recent Value   SBIRT (25 yo +)    In order to provide better care to our patients, we are screening all of our patients for alcohol and drug use  Would it be okay to ask you these screening questions? No Filed at: 08/20/2022 1120                    MDM  Number of Diagnoses or Management Options     Amount and/or Complexity of Data Reviewed  Clinical lab tests: reviewed  Review and summarize past medical records: yes        Disposition  Final diagnoses:   UTI (urinary tract infection)   Myalgia   Hypokalemia     Time reflects when diagnosis was documented in both MDM as applicable and the Disposition within this note     Time User Action Codes Description Comment    8/20/2022 12:23 PM Salvatore Wilson Add [N39 0] UTI (urinary tract infection)     8/20/2022 12:23 PM [place] H[place] Add [G18 59] Myalgia     8/20/2022 12:23 PM Dl Hoyt Add [E87 6] Hypokalemia       ED Disposition     ED Disposition   Discharge    Condition   Stable    Date/Time   Sat Aug 20, 2022 12:26 PM    Comment   Izetta Course discharge to home/self care                 Follow-up Information     Follow up With Specialties Details Why Chapo Diaz MD Family Medicine Call in 2 days  [address] [place] [address] [phone]            Patient's Medications   Discharge Prescriptions    CEPHALEXIN (KEFLEX) 500 MG CAPSULE    Take 1 capsule (500 mg total) by mouth every 6 (six) hours for 5 days       Start Date: 8/20/2022 End Date: 8/25/2022       Order Dose: 500 mg       Quantity: 20 capsule    Refills: 0       No discharge procedures on file      PDMP Review     None          ED Provider  Electronically Signed by           Jacqueline Daily MD  08/20/22 [address] [facility] Roni Carvalho MD  08/20/22 [facility]8

## 2022-08-21 LAB
ATRIAL RATE: 82 BPM
B BURGDOR IGG+IGM SER-ACNC: 0.2 AI
P AXIS: 55 DEGREES
PR INTERVAL: 140 MS
QRS AXIS: 95 DEGREES
QRSD INTERVAL: 82 MS
QT INTERVAL: 372 MS
QTC INTERVAL: 434 MS
T WAVE AXIS: 36 DEGREES
VENTRICULAR RATE: 82 BPM

## 2022-10-27 ENCOUNTER — HOSPITAL ENCOUNTER (EMERGENCY)
Facility: HOSPITAL | Age: 27
Discharge: HOME | End: 2022-10-27
Attending: EMERGENCY MEDICINE
Payer: COMMERCIAL

## 2022-10-27 VITALS
RESPIRATION RATE: 20 BRPM | HEART RATE: 132 BPM | DIASTOLIC BLOOD PRESSURE: 63 MMHG | OXYGEN SATURATION: 95 % | TEMPERATURE: 97.3 F | SYSTOLIC BLOOD PRESSURE: 126 MMHG

## 2022-10-27 DIAGNOSIS — R11.2 NAUSEA AND VOMITING, UNSPECIFIED VOMITING TYPE: Primary | ICD-10-CM

## 2022-10-27 LAB
ALBUMIN SERPL BCP-MCNC: 4.6 G/DL (ref 3.5–5)
ALP SERPL-CCNC: 54 U/L (ref 46–116)
ALT SERPL W P-5'-P-CCNC: 13 U/L (ref 12–78)
ANION GAP SERPL CALCULATED.3IONS-SCNC: 12 MMOL/L (ref 4–13)
AST SERPL W P-5'-P-CCNC: 20 U/L (ref 5–45)
B-HCG SERPL-ACNC: <1 MIU/ML (ref 0–11.6)
BASOPHILS # BLD AUTO: 0.04 THOUSANDS/ÂΜL (ref 0–0.1)
BASOPHILS NFR BLD AUTO: 0 % (ref 0–1)
BILIRUB SERPL-MCNC: 0.97 MG/DL (ref 0.2–1)
BUN SERPL-MCNC: 11 MG/DL (ref 5–25)
CALCIUM SERPL-MCNC: 9.3 MG/DL (ref 8.3–10.1)
CHLORIDE SERPL-SCNC: 99 MMOL/L (ref 96–108)
CO2 SERPL-SCNC: 26 MMOL/L (ref 21–32)
CREAT SERPL-MCNC: 0.67 MG/DL (ref 0.6–1.3)
EOSINOPHIL # BLD AUTO: 0.16 THOUSAND/ÂΜL (ref 0–0.61)
EOSINOPHIL NFR BLD AUTO: 1 % (ref 0–6)
ERYTHROCYTE [DISTWIDTH] IN BLOOD BY AUTOMATED COUNT: 12.8 % (ref 11.6–15.1)
FLUAV RNA RESP QL NAA+PROBE: NEGATIVE
FLUBV RNA RESP QL NAA+PROBE: NEGATIVE
GFR SERPL CREATININE-BSD FRML MDRD: 121 ML/MIN/1.73SQ M
GLUCOSE SERPL-MCNC: 101 MG/DL (ref 65–140)
HCT VFR BLD AUTO: 40.1 % (ref 34.8–46.1)
HGB BLD-MCNC: 13.7 G/DL (ref 11.5–15.4)
IMM GRANULOCYTES # BLD AUTO: 0.04 THOUSAND/UL (ref 0–0.2)
IMM GRANULOCYTES NFR BLD AUTO: 0 % (ref 0–2)
LACTATE SERPL-SCNC: 0.8 MMOL/L (ref 0.5–2)
LIPASE SERPL-CCNC: 73 U/L (ref 73–393)
LYMPHOCYTES # BLD AUTO: 0.86 THOUSANDS/ÂΜL (ref 0.6–4.47)
LYMPHOCYTES NFR BLD AUTO: 8 % (ref 14–44)
MAGNESIUM SERPL-MCNC: 1.6 MG/DL (ref 1.6–2.6)
MCH RBC QN AUTO: 30.4 PG (ref 26.8–34.3)
MCHC RBC AUTO-ENTMCNC: 34.2 G/DL (ref 31.4–37.4)
MCV RBC AUTO: 89 FL (ref 82–98)
MONOCYTES # BLD AUTO: 0.95 THOUSAND/ÂΜL (ref 0.17–1.22)
MONOCYTES NFR BLD AUTO: 9 % (ref 4–12)
NEUTROPHILS # BLD AUTO: 9.09 THOUSANDS/ÂΜL (ref 1.85–7.62)
NEUTS SEG NFR BLD AUTO: 82 % (ref 43–75)
NRBC BLD AUTO-RTO: 0 /100 WBCS
PLATELET # BLD AUTO: 223 THOUSANDS/UL (ref 149–390)
PMV BLD AUTO: 11.5 FL (ref 8.9–12.7)
POTASSIUM SERPL-SCNC: 3.6 MMOL/L (ref 3.5–5.3)
PROT SERPL-MCNC: 7.9 G/DL (ref 6.4–8.4)
RBC # BLD AUTO: 4.5 MILLION/UL (ref 3.81–5.12)
RSV RNA RESP QL NAA+PROBE: NEGATIVE
SARS-COV-2 RNA RESP QL NAA+PROBE: NEGATIVE
SODIUM SERPL-SCNC: 137 MMOL/L (ref 135–147)
WBC # BLD AUTO: 11.14 THOUSAND/UL (ref 4.31–10.16)

## 2022-10-27 PROCEDURE — 36415 COLL VENOUS BLD VENIPUNCTURE: CPT | Performed by: EMERGENCY MEDICINE

## 2022-10-27 PROCEDURE — 84702 CHORIONIC GONADOTROPIN TEST: CPT | Performed by: EMERGENCY MEDICINE

## 2022-10-27 PROCEDURE — 85025 COMPLETE CBC W/AUTO DIFF WBC: CPT | Performed by: EMERGENCY MEDICINE

## 2022-10-27 PROCEDURE — 0241U HB NFCT DS VIR RESP RNA 4 TRGT: CPT | Performed by: EMERGENCY MEDICINE

## 2022-10-27 PROCEDURE — 80053 COMPREHEN METABOLIC PANEL: CPT | Performed by: EMERGENCY MEDICINE

## 2022-10-27 PROCEDURE — 83690 ASSAY OF LIPASE: CPT | Performed by: EMERGENCY MEDICINE

## 2022-10-27 PROCEDURE — 83735 ASSAY OF MAGNESIUM: CPT | Performed by: EMERGENCY MEDICINE

## 2022-10-27 PROCEDURE — 83605 ASSAY OF LACTIC ACID: CPT | Performed by: EMERGENCY MEDICINE

## 2022-10-27 RX ORDER — DROPERIDOL 2.5 MG/ML
1.25 INJECTION, SOLUTION INTRAMUSCULAR; INTRAVENOUS ONCE
Status: DISCONTINUED | OUTPATIENT
Start: 2022-10-27 | End: 2022-10-27 | Stop reason: HOSPADM

## 2022-10-27 RX ORDER — ONDANSETRON 4 MG/1
4 TABLET, ORALLY DISINTEGRATING ORAL EVERY 6 HOURS PRN
Qty: 20 TABLET | Refills: 0 | Status: SHIPPED | OUTPATIENT
Start: 2022-10-27

## 2022-10-27 RX ORDER — METOCLOPRAMIDE HYDROCHLORIDE 5 MG/ML
10 INJECTION INTRAMUSCULAR; INTRAVENOUS ONCE
Status: DISCONTINUED | OUTPATIENT
Start: 2022-10-27 | End: 2022-10-27 | Stop reason: HOSPADM

## 2022-10-27 RX ORDER — DIPHENHYDRAMINE HYDROCHLORIDE 50 MG/ML
12.5 INJECTION INTRAMUSCULAR; INTRAVENOUS ONCE
Status: COMPLETED | OUTPATIENT
Start: 2022-10-27 | End: 2022-10-27

## 2022-10-27 RX ORDER — ONDANSETRON 2 MG/ML
4 INJECTION INTRAMUSCULAR; INTRAVENOUS ONCE
Status: COMPLETED | OUTPATIENT
Start: 2022-10-27 | End: 2022-10-27

## 2022-10-27 RX ADMIN — ONDANSETRON 4 MG: 2 INJECTION INTRAMUSCULAR; INTRAVENOUS at 02:24

## 2022-10-27 RX ADMIN — DIPHENHYDRAMINE HYDROCHLORIDE 12.5 MG: 50 INJECTION, SOLUTION INTRAMUSCULAR; INTRAVENOUS at 02:25

## 2022-10-27 RX ADMIN — SODIUM CHLORIDE 1000 ML: 0.9 INJECTION, SOLUTION INTRAVENOUS at 02:24

## 2022-10-27 NOTE — ED PROVIDER NOTES
History  Chief Complaint   Patient presents with   • Vomiting     Pt reports nausea and vomiting for two days ago     Complains of nausea vomiting for the past 2-3 days  Has abdominal cramping  No fevers  Has chills  Was seen in the ED a few weeks ago for same  Still smokes marijuana  No recent cough or cold symptoms  History provided by:  Patient   used: No    Vomiting  Severity:  Mild  Timing:  Constant  Progression:  Unchanged  Chronicity:  Recurrent  Recent urination:  Normal  Relieved by:  Nothing  Worsened by:  Nothing  Associated symptoms: abdominal pain    Associated symptoms: no chills, no cough, no diarrhea, no fever, no headaches, no myalgias and no sore throat        Prior to Admission Medications   Prescriptions Last Dose Informant Patient Reported? Taking? Lidocaine Viscous HCl (XYLOCAINE) 2 % mucosal solution   Yes No   Sig: 15 mL by Transmucosal route   buprenorphine-naloxone (Suboxone) 8-2 mg   Yes No   Sig: Place 2 Film under the tongue daily    norgestimate-ethinyl estradiol (ORTHO-CYCLEN) 0 25-35 MG-MCG per tablet   Yes No   Sig: Take 1 tablet by mouth daily    Patient not taking: Reported on 8/18/2022   phenazopyridine (PYRIDIUM) 200 mg tablet   No No   Sig: Take 1 tablet (200 mg total) by mouth 3 (three) times a day   Patient not taking: Reported on 8/18/2022      Facility-Administered Medications: None       History reviewed  No pertinent past medical history  History reviewed  No pertinent surgical history  History reviewed  No pertinent family history  I have reviewed and agree with the history as documented      E-Cigarette/Vaping   • E-Cigarette Use Never User      E-Cigarette/Vaping Substances     Social History     Tobacco Use   • Smoking status: Current Every Day Smoker     Packs/day: 1 00   • Smokeless tobacco: Never Used   Vaping Use   • Vaping Use: Never used   Substance Use Topics   • Alcohol use: No   • Drug use: No       Review of Systems Constitutional: Negative for chills and fever  HENT: Negative for ear pain, hearing loss, sore throat, trouble swallowing and voice change  Eyes: Negative for pain and discharge  Respiratory: Negative for cough, shortness of breath and wheezing  Cardiovascular: Negative for chest pain and palpitations  Gastrointestinal: Positive for abdominal pain and vomiting  Negative for blood in stool, constipation, diarrhea and nausea  Genitourinary: Negative for dysuria, flank pain, frequency and hematuria  Musculoskeletal: Negative for joint swelling, myalgias, neck pain and neck stiffness  Skin: Negative for rash and wound  Neurological: Negative for dizziness, seizures, syncope, facial asymmetry and headaches  Psychiatric/Behavioral: Negative for hallucinations, self-injury and suicidal ideas  All other systems reviewed and are negative  Physical Exam  Physical Exam  Vitals and nursing note reviewed  Constitutional:       General: She is not in acute distress  Appearance: She is well-developed  HENT:      Head: Normocephalic and atraumatic  Right Ear: External ear normal       Left Ear: External ear normal    Eyes:      General: No scleral icterus  Right eye: No discharge  Left eye: No discharge  Extraocular Movements: Extraocular movements intact  Conjunctiva/sclera: Conjunctivae normal    Cardiovascular:      Rate and Rhythm: Normal rate and regular rhythm  Heart sounds: Normal heart sounds  No murmur heard  Pulmonary:      Effort: Pulmonary effort is normal       Breath sounds: Normal breath sounds  No wheezing or rales  Abdominal:      General: Bowel sounds are normal  There is no distension  Palpations: Abdomen is soft  Tenderness: There is no abdominal tenderness  There is no guarding or rebound  Musculoskeletal:         General: No deformity  Normal range of motion  Cervical back: Normal range of motion and neck supple  Skin:     General: Skin is warm and dry  Findings: No rash  Neurological:      General: No focal deficit present  Mental Status: She is alert and oriented to person, place, and time  Cranial Nerves: No cranial nerve deficit  Psychiatric:         Mood and Affect: Mood normal          Behavior: Behavior normal          Thought Content:  Thought content normal          Judgment: Judgment normal          Vital Signs  ED Triage Vitals   Temperature Pulse Respirations Blood Pressure SpO2   10/27/22 0210 10/27/22 0210 10/27/22 0210 10/27/22 0211 10/27/22 0210   (!) 97 3 °F (36 3 °C) (!) 132 20 126/63 95 %      Temp Source Heart Rate Source Patient Position - Orthostatic VS BP Location FiO2 (%)   10/27/22 0210 10/27/22 0210 -- -- --   Temporal Monitor         Pain Score       --                  Vitals:    10/27/22 0210 10/27/22 0211   BP:  126/63   Pulse: (!) 132          Visual Acuity      ED Medications  Medications   droperidol (INAPSINE) injection 1 25 mg (1 25 mg Intravenous Not Given 10/27/22 0217)   metoclopramide (REGLAN) injection 10 mg (10 mg Intravenous Not Given 10/27/22 0214)   sodium chloride 0 9 % bolus 1,000 mL (0 mL Intravenous Stopped 10/27/22 0324)   ondansetron (ZOFRAN) injection 4 mg (4 mg Intravenous Given 10/27/22 0224)   sodium chloride 0 9 % bolus 1,000 mL (1,000 mL Intravenous New Bag 10/27/22 0224)   diphenhydrAMINE (BENADRYL) injection 12 5 mg (12 5 mg Intravenous Given 10/27/22 0225)       Diagnostic Studies  Results Reviewed     Procedure Component Value Units Date/Time    FLU/RSV/COVID - if FLU/RSV clinically relevant [[id number]]  (Normal) Collected: 10/27/22 0229    Lab Status: Final result Specimen: Nares from Nose Updated: 10/27/22 0350     SARS-CoV-2 Negative     INFLUENZA A PCR Negative     INFLUENZA B PCR Negative     RSV PCR Negative    Narrative:      FOR PEDIATRIC PATIENTS - copy/paste COVID Guidelines URL to browser: [email or web address]    SARS-CoV-2 assay is a Nucleic Acid Amplification assay intended for the  qualitative detection of nucleic acid from SARS-CoV-2 in nasopharyngeal  swabs  Results are for the presumptive identification of SARS-CoV-2 RNA  Positive results are indicative of infection with SARS-CoV-2, the virus  causing COVID-19, but do not rule out bacterial infection or co-infection  with other viruses  Laboratories within the [place] and its  territories are required to report all positive results to the appropriate  public health authorities  Negative results do not preclude SARS-CoV-2  infection and should not be used as the sole basis for treatment or other  patient management decisions  Negative results must be combined with  clinical observations, patient history, and epidemiological information  This test has not been FDA cleared or approved  This test has been authorized by FDA under an Emergency Use Authorization  (EUA)  This test is only authorized for the duration of time the  declaration that circumstances exist justifying the authorization of the  emergency use of an in vitro diagnostic tests for detection of SARS-CoV-2  virus and/or diagnosis of COVID-19 infection under section 564(b)(1) of  the Act, 21 U  S C  044MIW-4(U)(6), unless the authorization is terminated  or revoked sooner  The test has been validated but independent review by FDA  and CLIA is pending  Test performed using [employer] GeneXpert: This RT-PCR assay targets N2,  a region unique to SARS-CoV-2  A conserved region in the E-gene was chosen  for pan-Sarbecovirus detection which includes SARS-CoV-2  According to CMS-2020-01-R, this platform meets the definition of high-throughput technology      hCG, quantitative [[id number]]  (Normal) Collected: 10/27/22 0229    Lab Status: Final result Specimen: Blood from Arm, Left Updated: 10/27/22 0318     HCG, Quant <1 mIU/mL Narrative:       Expected Ranges:     Approximate               Approximate HCG  Gestation age          Concentration ( mIU/mL)  _____________          ______________________   Frederich Mcburney                      HCG values  0 2-1                       5-50  1-2                           2-3                         100-5000  3-4                         500-14867  4-5                         1000-13939  5-6                         [id number]  6-8                         [id number]  8-12                        90238-810259      Lactic acid [[id number]]  (Normal) Collected: 10/27/22 0229    Lab Status: Final result Specimen: Blood from Arm, Left Updated: 10/27/22 0317     LACTIC ACID 0 8 mmol/L     Narrative:      Result may be elevated if tourniquet was used during collection      Comprehensive metabolic panel [[id number]] Collected: 10/27/22 0229    Lab Status: Final result Specimen: Blood from Arm, Left Updated: 10/27/22 7797     Sodium 137 mmol/L      Potassium 3 6 mmol/L      Chloride 99 mmol/L      CO2 26 mmol/L      ANION GAP 12 mmol/L      BUN 11 mg/dL      Creatinine 0 67 mg/dL      Glucose 101 mg/dL      Calcium 9 3 mg/dL      AST 20 U/L      ALT 13 U/L      Alkaline Phosphatase 54 U/L      Total Protein 7 9 g/dL      Albumin 4 6 g/dL      Total Bilirubin 0 97 mg/dL      eGFR 121 ml/min/1 73sq m     Narrative:      [facility] guidelines for Chronic Kidney Disease (CKD):   •  Stage 1 with normal or high GFR (GFR > 90 mL/min/1 73 square meters)  •  Stage 2 Mild CKD (GFR = 60-89 mL/min/1 73 square meters)  •  Stage 3A Moderate CKD (GFR = 45-59 mL/min/1 73 square meters)  •  Stage 3B Moderate CKD (GFR = 30-44 mL/min/1 73 square meters)  •  Stage 4 Severe CKD (GFR = 15-29 mL/min/1 73 square meters)  •  Stage 5 End Stage CKD (GFR <15 mL/min/1 73 square meters)  Note: GFR calculation is accurate only with a steady state creatinine    Lipase [[id number]]  (Normal) Collected: 10/27/22 7243 Lab Status: Final result Specimen: Blood from Arm, Left Updated: 10/27/22 0312     Lipase 73 u/L     Magnesium [[id number]]  (Normal) Collected: 10/27/22 0229    Lab Status: Final result Specimen: Blood from Arm, Left Updated: 10/27/22 0312     Magnesium 1 6 mg/dL     CBC and differential [[id number]]  (Abnormal) Collected: 10/27/22 0229    Lab Status: Final result Specimen: Blood from Arm, Left Updated: 10/27/22 0248     WBC 11 14 Thousand/uL      RBC 4 50 Million/uL      Hemoglobin 13 7 g/dL      Hematocrit 40 1 %      MCV 89 fL      MCH 30 4 pg      MCHC 34 2 g/dL      RDW 12 8 %      MPV 11 5 fL      Platelets 237 Thousands/uL      nRBC 0 /100 WBCs      Neutrophils Relative 82 %      Immat GRANS % 0 %      Lymphocytes Relative 8 %      Monocytes Relative 9 %      Eosinophils Relative 1 %      Basophils Relative 0 %      Neutrophils Absolute 9 09 Thousands/µL      Immature Grans Absolute 0 04 Thousand/uL      Lymphocytes Absolute 0 86 Thousands/µL      Monocytes Absolute 0 95 Thousand/µL      Eosinophils Absolute 0 16 Thousand/µL      Basophils Absolute 0 04 Thousands/µL                  No orders to display              Procedures  Procedures         ED Course  ED Course as of 10/27/22 0354   u Oct 27, 2022   0352 Patient tolerating p o   Will discharge  Discussed with patient about marijuana use  SBIRT 20yo+    Flowsheet Row Most Recent Value   SBIRT (25 yo +)    In order to provide better care to our patients, we are screening all of our patients for alcohol and drug use  Would it be okay to ask you these screening questions? Yes Filed at: 10/27/2022 9195   Initial Alcohol Screen: US AUDIT-C     1  How often do you have a drink containing alcohol? 0 Filed at: 10/27/2022 0318   2  How many drinks containing alcohol do you have on a typical day you are drinking? 0 Filed at: 10/27/2022 0318   3a  Male UNDER 65: How often do you have five or more drinks on one occasion?  0 Filed at: 10/27/2022 0318   3b  FEMALE Any Age, or MALE 65+: How often do you have 4 or more drinks on one occassion? 0 Filed at: 10/27/2022 0318   Audit-C Score 0 Filed at: 10/27/2022 4020   VERA: How many times in the past year have you    Used an illegal drug or used a prescription medication for non-medical reasons? Never Filed at: 10/27/2022 6328                    [facility]  Number of Diagnoses or Management Options     Amount and/or Complexity of Data Reviewed  Clinical lab tests: reviewed  Review and summarize past medical records: yes        Disposition  Final diagnoses:   Nausea and vomiting, unspecified vomiting type     Time reflects when diagnosis was documented in both MDM as applicable and the Disposition within this note     Time User Action Codes Description Comment    10/27/2022  3:53 AM Autumn Wilson Add [R11 2] Nausea and vomiting, unspecified vomiting type       ED Disposition     ED Disposition   Discharge    Condition   Stable    Date/Time   Thu Oct 27, 2022  3:53 AM    Comment   Shweta Gilbert discharge to home/self care  Follow-up Information    None         Patient's Medications   Discharge Prescriptions    ONDANSETRON (ZOFRAN ODT) 4 MG DISINTEGRATING TABLET    Take 1 tablet (4 mg total) by mouth every 6 (six) hours as needed for nausea or vomiting       Start Date: 10/27/2022End Date: --       Order Dose: 4 mg       Quantity: 20 tablet    Refills: 0       No discharge procedures on file      PDMP Review     None          ED Provider  Electronically Signed by           Yordan Irwin MD  10/27/22 5437

## 2025-01-18 ENCOUNTER — HOSPITAL ENCOUNTER (EMERGENCY)
Facility: HOSPITAL | Age: 30
Discharge: HOME | End: 2025-01-18
Attending: EMERGENCY MEDICINE
Payer: COMMERCIAL

## 2025-01-18 VITALS
WEIGHT: 128 LBS | RESPIRATION RATE: 16 BRPM | HEART RATE: 90 BPM | TEMPERATURE: 99 F | SYSTOLIC BLOOD PRESSURE: 127 MMHG | DIASTOLIC BLOOD PRESSURE: 80 MMHG | OXYGEN SATURATION: 99 %

## 2025-01-18 DIAGNOSIS — K08.89 PAIN, DENTAL: Primary | ICD-10-CM

## 2025-01-18 PROCEDURE — 99282 EMERGENCY DEPT VISIT SF MDM: CPT

## 2025-01-18 RX ORDER — IBUPROFEN 800 MG/1
800 TABLET, FILM COATED ORAL 3 TIMES DAILY
Qty: 21 TABLET | Refills: 0 | Status: SHIPPED | OUTPATIENT
Start: 2025-01-18

## 2025-01-18 RX ORDER — ACETAMINOPHEN 325 MG/1
650 TABLET ORAL EVERY 6 HOURS PRN
Qty: 30 TABLET | Refills: 0 | Status: SHIPPED | OUTPATIENT
Start: 2025-01-18

## 2025-01-18 RX ORDER — CLINDAMYCIN HYDROCHLORIDE 150 MG/1
450 CAPSULE ORAL ONCE
Status: COMPLETED | OUTPATIENT
Start: 2025-01-18 | End: 2025-01-18

## 2025-01-18 RX ORDER — CLINDAMYCIN HYDROCHLORIDE 150 MG/1
450 CAPSULE ORAL EVERY 6 HOURS SCHEDULED
Qty: 48 CAPSULE | Refills: 0 | Status: SHIPPED | OUTPATIENT
Start: 2025-01-18 | End: 2025-01-22

## 2025-01-18 RX ADMIN — CLINDAMYCIN HYDROCHLORIDE 450 MG: 150 CAPSULE ORAL at 15:40

## 2025-01-18 NOTE — DISCHARGE INSTRUCTIONS
You are seen in the emergency department for dental pain, we have performed a nerve block, and your symptoms improved.  Please follow-up with your dentist as scheduled this Tuesday.  We provided you with a new prescription for antibiotics.  Please pick it over the pharmacy and take as directed.  If your symptoms worsen or persist, please return to the emergency department immediately.

## 2025-01-18 NOTE — ED PROVIDER NOTES
Time reflects when diagnosis was documented in both MDM as applicable and the Disposition within this note       Time User Action Codes Description Comment    1/18/2025  3:34 PM Dayo Dai Add [K08.89] Pain, dental           ED Disposition       ED Disposition   Discharge    Condition   Stable    Date/Time   Sat Jan 18, 2025  3:34 PM    Comment   Yessenia Bucio discharge to home/self care.                   Assessment & Plan       Medical Decision Making  29-year-old female presents to the emergency department with complaint of dental pain, differential diagnosis include chronic dental pain, cracked tooth, poor dentition, dental fracture, osteitis, dental abscess, will start patient on clindamycin, provide her with Tylenol, Motrin, and have her follow-up with her dentist on Tuesday.  Strict return precautions given.  Patient understands and agrees with treatment.    Risk  OTC drugs.  Prescription drug management.        ED Course as of 01/18/25 1546   Sat Jan 18, 2025   1541 Nerve block performed, patient's symptoms improved significantly, she will follow-up with her dentist this Tuesday.  Strict return precautions given.  Patient or stands and agrees with treatment plan.       Medications   clindamycin (CLEOCIN) capsule 450 mg (450 mg Oral Given 1/18/25 1540)       ED Risk Strat Scores                          SBIRT 22yo+      Flowsheet Row Most Recent Value   Initial Alcohol Screen: US AUDIT-C     1. How often do you have a drink containing alcohol? 0 Filed at: 01/18/2025 1509   2. How many drinks containing alcohol do you have on a typical day you are drinking?  0 Filed at: 01/18/2025 1509   3b. FEMALE Any Age, or MALE 65+: How often do you have 4 or more drinks on one occassion? 0 Filed at: 01/18/2025 1509   Audit-C Score 0 Filed at: 01/18/2025 1509   VERA: How many times in the past year have you...    Used an illegal drug or used a prescription medication for non-medical reasons? Never Filed at: 01/18/2025  1509                            History of Present Illness       Chief Complaint   Patient presents with    Dental Pain     Patient presents to the ED with complaints of dental pain to the right lower side. The patient reports an abscess in that area and states she is taking clindamycin since Monday for the same. The patient reports having an appointment with an oral surgeon, however it is not until March.        History reviewed. No pertinent past medical history.   History reviewed. No pertinent surgical history.   History reviewed. No pertinent family history.   Social History     Tobacco Use    Smoking status: Every Day     Current packs/day: 1.00     Types: Cigarettes    Smokeless tobacco: Never   Vaping Use    Vaping status: Never Used   Substance Use Topics    Alcohol use: No    Drug use: No      E-Cigarette/Vaping    E-Cigarette Use Never User       E-Cigarette/Vaping Substances      I have reviewed and agree with the history as documented.     21-year-old female with no past medical history presents to the emergency department with complaint of right lower molar dental pain.  Patient states that she has had a cracked tooth that since November which she had a root canal for, patient states that she started to have increased pain at the right lower molar region.  Patient stated she was getting some chills.  She reports an episode of nausea and vomiting.  Patient has been drinking cold water to manage her symptoms.  Patient did just complete a course of clindamycin.  She denies any other medical complaints.      Dental Pain  Associated symptoms: no fever        Review of Systems   Constitutional:  Negative for chills and fever.   HENT:  Positive for dental problem. Negative for ear pain and sore throat.    Eyes:  Negative for pain and visual disturbance.   Respiratory:  Negative for cough and shortness of breath.    Cardiovascular:  Negative for chest pain and palpitations.   Gastrointestinal:  Negative for  abdominal pain and vomiting.   Genitourinary:  Negative for dysuria and hematuria.   Musculoskeletal:  Negative for arthralgias and back pain.   Skin:  Negative for color change and rash.   Neurological:  Negative for seizures and syncope.   All other systems reviewed and are negative.          Objective       ED Triage Vitals [01/18/25 1509]   Temperature Pulse Blood Pressure Respirations SpO2 Patient Position - Orthostatic VS   99 °F (37.2 °C) (!) 110 135/87 16 99 % Sitting      Temp Source Heart Rate Source BP Location FiO2 (%) Pain Score    Temporal Monitor Right arm -- 10 - Worst Possible Pain      Vitals      Date and Time Temp Pulse SpO2 Resp BP Pain Score FACES Pain Rating User   01/18/25 1530 -- 90 99 % 16 127/80 -- -- LAK   01/18/25 1509 99 °F (37.2 °C) 110 99 % 16 135/87 10 - Worst Possible Pain -- RR            Physical Exam  Vitals and nursing note reviewed.   Constitutional:       General: She is not in acute distress.     Appearance: She is well-developed.   HENT:      Head: Normocephalic and atraumatic.      Mouth/Throat:      Comments: Right lower molar old cracked tooth, no gingival swelling, no signs of periapical abscess, no erythema, swelling.  Eyes:      Conjunctiva/sclera: Conjunctivae normal.   Cardiovascular:      Rate and Rhythm: Normal rate and regular rhythm.   Pulmonary:      Effort: Pulmonary effort is normal. No respiratory distress.      Breath sounds: Normal breath sounds.   Abdominal:      Palpations: Abdomen is soft.      Tenderness: There is no abdominal tenderness.   Musculoskeletal:         General: No swelling.      Cervical back: Neck supple.   Skin:     General: Skin is warm and dry.      Capillary Refill: Capillary refill takes less than 2 seconds.   Neurological:      Mental Status: She is alert.   Psychiatric:         Mood and Affect: Mood normal.         Results Reviewed       None            No orders to display       Nerve block    Date/Time: 1/18/2025 3:46  "PM    Performed by: Dayo Dai DO  Authorized by: Dayo Dai DO    Patient location:  ED  [place] Protocol:  procedure performed by consultantConsent: Verbal consent obtained.  Risks and benefits: risks, benefits and alternatives were discussed  Consent given by: patient  Time out: Immediately prior to procedure a \"time out\" was called to verify the correct patient, procedure, equipment, support staff and site/side marked as required.  Timeout called at: 1/18/2025 3:46 PM.  Patient understanding: patient states understanding of the procedure being performed  Patient identity confirmed: verbally with patient, arm band and hospital-assigned identification number    Indications:     Indications:  Pain relief  Procedure details (see MAR for exact dosages):     Block needle gauge:  25 G    Anesthetic injected:  Lidocaine 1% w/o epi    Injection procedure:  Anatomic landmarks identified, incremental injection, negative aspiration for blood, introduced needle and anatomic landmarks palpated  Post-procedure details:     Patient tolerance of procedure:  Tolerated well, no immediate complications      ED Medication and Procedure Management   Prior to Admission Medications   Prescriptions Last Dose Informant Patient Reported? Taking?   Lidocaine Viscous HCl (XYLOCAINE) 2 % mucosal solution   Yes No   Sig: 15 mL by Transmucosal route   buprenorphine-naloxone (Suboxone) 8-2 mg   Yes No   Sig: Place 2 Film under the tongue daily    norgestimate-ethinyl estradiol (ORTHO-CYCLEN) 0.25-35 MG-MCG per tablet   Yes No   Sig: Take 1 tablet by mouth daily    Patient not taking: Reported on 8/18/2022   ondansetron (Zofran ODT) 4 mg disintegrating tablet   No No   Sig: Take 1 tablet (4 mg total) by mouth every 6 (six) hours as needed for nausea or vomiting   phenazopyridine (PYRIDIUM) 200 mg tablet   No No   Sig: Take 1 tablet (200 mg total) by mouth 3 (three) times a day   Patient not taking: Reported " on 8/18/2022      Facility-Administered Medications: None     Discharge Medication List as of 1/18/2025  3:37 PM        START taking these medications    Details   acetaminophen (TYLENOL) 325 mg tablet Take 2 tablets (650 mg total) by mouth every 6 (six) hours as needed for mild pain, Starting Sat 1/18/2025, Normal      clindamycin (CLEOCIN) 150 mg capsule Take 3 capsules (450 mg total) by mouth every 6 (six) hours for 4 days, Starting Sat 1/18/2025, Until Wed 1/22/2025, Normal      ibuprofen (MOTRIN) 800 mg tablet Take 1 tablet (800 mg total) by mouth 3 (three) times a day, Starting Sat 1/18/2025, Normal           CONTINUE these medications which have NOT CHANGED    Details   buprenorphine-naloxone (Suboxone) 8-2 mg Place 2 Film under the tongue daily , Starting Tue 7/17/2018, Historical Med      Lidocaine Viscous HCl (XYLOCAINE) 2 % mucosal solution 15 mL by Transmucosal route, Starting Mon 4/4/2022, Historical Med      norgestimate-ethinyl estradiol (ORTHO-CYCLEN) 0.25-35 MG-MCG per tablet Take 1 tablet by mouth daily , Starting Thu 6/14/2018, Historical Med      ondansetron (Zofran ODT) 4 mg disintegrating tablet Take 1 tablet (4 mg total) by mouth every 6 (six) hours as needed for nausea or vomiting, Starting Thu 10/27/2022, Normal      phenazopyridine (PYRIDIUM) 200 mg tablet Take 1 tablet (200 mg total) by mouth 3 (three) times a day, Starting Fri 3/15/2019, Print           No discharge procedures on file.  ED SEPSIS DOCUMENTATION   Time reflects when diagnosis was documented in both MDM as applicable and the Disposition within this note       Time User Action Codes Description Comment    1/18/2025  3:34 PM Dayo Dai Add [K08.89] Pain, dental                  Dayo Dai DO  01/18/25 0633

## 2025-04-04 ENCOUNTER — APPOINTMENT (OUTPATIENT)
Dept: URGENT CARE | Facility: MEDICAL CENTER | Age: 30
End: 2025-04-04